# Patient Record
Sex: FEMALE | Race: WHITE | Employment: FULL TIME | URBAN - METROPOLITAN AREA
[De-identification: names, ages, dates, MRNs, and addresses within clinical notes are randomized per-mention and may not be internally consistent; named-entity substitution may affect disease eponyms.]

---

## 2017-04-19 RX ORDER — SODIUM CHLORIDE 9 MG/ML
25 INJECTION, SOLUTION INTRAVENOUS CONTINUOUS
Status: DISPENSED | OUTPATIENT
Start: 2017-04-25 | End: 2017-04-25

## 2017-04-19 RX ORDER — COSYNTROPIN 0.25 MG/ML
0.25 INJECTION, POWDER, FOR SOLUTION INTRAMUSCULAR; INTRAVENOUS ONCE
Status: COMPLETED | OUTPATIENT
Start: 2017-04-25 | End: 2017-04-25

## 2017-04-25 ENCOUNTER — HOSPITAL ENCOUNTER (OUTPATIENT)
Dept: INFUSION THERAPY | Age: 42
Discharge: HOME | End: 2017-04-25
Payer: COMMERCIAL

## 2017-04-25 VITALS
OXYGEN SATURATION: 100 % | RESPIRATION RATE: 16 BRPM | HEART RATE: 76 BPM | SYSTOLIC BLOOD PRESSURE: 110 MMHG | DIASTOLIC BLOOD PRESSURE: 77 MMHG | TEMPERATURE: 97 F

## 2017-04-25 LAB
CORTIS 1H P CHAL SERPL-MCNC: 28.6 UG/DL
CORTIS 30M P CHAL SERPL-MCNC: 26 UG/DL
CORTIS BS SERPL-MCNC: 8.1 UG/DL

## 2017-04-25 PROCEDURE — 82024 ASSAY OF ACTH: CPT | Performed by: INTERNAL MEDICINE

## 2017-04-25 PROCEDURE — 96374 THER/PROPH/DIAG INJ IV PUSH: CPT

## 2017-04-25 PROCEDURE — 36415 COLL VENOUS BLD VENIPUNCTURE: CPT | Performed by: INTERNAL MEDICINE

## 2017-04-25 PROCEDURE — 82533 TOTAL CORTISOL: CPT | Performed by: INTERNAL MEDICINE

## 2017-04-25 PROCEDURE — 74011250636 HC RX REV CODE- 250/636: Performed by: INTERNAL MEDICINE

## 2017-04-25 PROCEDURE — 74011000250 HC RX REV CODE- 250

## 2017-04-25 RX ORDER — SODIUM CHLORIDE 0.9 % (FLUSH) 0.9 %
10 SYRINGE (ML) INJECTION AS NEEDED
Status: ACTIVE | OUTPATIENT
Start: 2017-04-25 | End: 2017-04-25

## 2017-04-25 RX ORDER — SODIUM CHLORIDE 9 MG/ML
INJECTION INTRAMUSCULAR; INTRAVENOUS; SUBCUTANEOUS
Status: COMPLETED
Start: 2017-04-25 | End: 2017-04-25

## 2017-04-25 RX ADMIN — SODIUM CHLORIDE 25 ML/HR: 900 INJECTION, SOLUTION INTRAVENOUS at 08:20

## 2017-04-25 RX ADMIN — COSYNTROPIN 0.25 MG: 0.25 INJECTION, POWDER, LYOPHILIZED, FOR SOLUTION INTRAMUSCULAR; INTRAVENOUS at 08:15

## 2017-04-25 RX ADMIN — Medication 10 ML: at 08:10

## 2017-04-25 RX ADMIN — SODIUM CHLORIDE 10 ML: 9 INJECTION INTRAMUSCULAR; INTRAVENOUS; SUBCUTANEOUS at 08:15

## 2017-04-25 NOTE — PROGRESS NOTES
[facility] VISIT NOTE    740 hrs   Pt arrived at [address] [place] [address] ambulatory and in no distress for Cortisol (Fasting). Assessment completed, pt voiced no new concerns at this time. Right wrist PIV #24 established with no difficulty. Positive blood return noted and baseline labs drawn. Blood pressure 110/77, pulse 76, temperature 97 °F (36.1 °C), resp. rate 16, SpO2 100 %, not currently breastfeeding. 0845 hrs - 30 minute labs drawn. 0915 hrs - 60 minute labs drawn. Medications received:  Cortisol IVP    Tolerated treatment well, no adverse reaction noted. PIV flushed per protocol. Positive blood return noted. No redness or swelling noted    0940 hrs -  D/C'd from [address] [place] [address] ambulatory and in no distress. Next appointment is TBD.

## 2017-04-26 LAB — ACTH PLAS-MCNC: 18.8 PG/ML (ref 7.2–63.3)

## 2017-06-16 ENCOUNTER — HOSPITAL ENCOUNTER (OUTPATIENT)
Dept: MRI IMAGING | Age: 42
Discharge: HOME | End: 2017-06-16
Attending: INTERNAL MEDICINE
Payer: COMMERCIAL

## 2017-06-16 DIAGNOSIS — R51.9 HA (HEADACHE): ICD-10-CM

## 2017-06-16 PROCEDURE — 70553 MRI BRAIN STEM W/O & W/DYE: CPT

## 2017-06-16 PROCEDURE — 74011000258 HC RX REV CODE- 258: Performed by: INTERNAL MEDICINE

## 2017-06-16 PROCEDURE — A9585 GADOBUTROL INJECTION: HCPCS | Performed by: INTERNAL MEDICINE

## 2017-06-16 PROCEDURE — 74011250636 HC RX REV CODE- 250/636: Performed by: INTERNAL MEDICINE

## 2017-06-16 RX ADMIN — SODIUM CHLORIDE 100 ML: 900 INJECTION, SOLUTION INTRAVENOUS at 12:00

## 2017-06-16 RX ADMIN — GADOBUTROL 7.5 ML: 604.72 INJECTION INTRAVENOUS at 12:00

## 2017-08-11 ENCOUNTER — OFFICE VISIT (OUTPATIENT)
Dept: NEUROLOGY | Age: 42
End: 2017-08-11

## 2017-08-11 VITALS
OXYGEN SATURATION: 100 % | TEMPERATURE: 98.3 F | HEART RATE: 66 BPM | HEIGHT: 68 IN | RESPIRATION RATE: 18 BRPM | BODY MASS INDEX: 29.25 KG/M2 | WEIGHT: 193 LBS | DIASTOLIC BLOOD PRESSURE: 68 MMHG | SYSTOLIC BLOOD PRESSURE: 110 MMHG

## 2017-08-11 DIAGNOSIS — G43.119 MIGRAINE WITH AURA, INTRACTABLE, WITHOUT STATUS MIGRAINOSUS: Primary | ICD-10-CM

## 2017-08-11 DIAGNOSIS — R42 DIZZY: ICD-10-CM

## 2017-08-11 DIAGNOSIS — R42 DIZZY: Primary | ICD-10-CM

## 2017-08-11 RX ORDER — SUMATRIPTAN AND NAPROXEN SODIUM 85; 500 MG/1; MG/1
TABLET, FILM COATED ORAL
Qty: 3 TAB | Refills: 0 | Status: SHIPPED | COMMUNITY
Start: 2017-08-11 | End: 2017-12-05

## 2017-08-11 RX ORDER — THYROID, PORCINE 30 MG/1
105 TABLET ORAL DAILY
Refills: 0 | COMMUNITY
Start: 2017-07-11

## 2017-08-11 RX ORDER — SUMATRIPTAN AND NAPROXEN SODIUM 85; 500 MG/1; MG/1
TABLET, FILM COATED ORAL
Qty: 9 TAB | Refills: 5 | Status: SHIPPED | OUTPATIENT
Start: 2017-08-11 | End: 2017-10-12 | Stop reason: SDUPTHER

## 2017-08-11 RX ORDER — BISMUTH SUBSALICYLATE 262 MG
1 TABLET,CHEWABLE ORAL DAILY
COMMUNITY

## 2017-08-11 NOTE — MR AVS SNAPSHOT
Visit Information Date & Time Provider Department Dept. Phone Encounter #  
 8/11/2017  1:30 PM Ras Mills MD [facility] Neurology [facility] [phone] [id number] Follow-up Instructions Return in about 2 months (around 10/11/2017). Upcoming Health Maintenance Date Due DTaP/Tdap/Td series (1 - Tdap) 8/16/1998 PAP AKA CERVICAL CYTOLOGY 8/16/1998 INFLUENZA AGE 9 TO ADULT 8/1/2017 Allergies as of 8/11/2017  Review Complete On: 8/11/2017 By: Ras Mills MD  
 No Known Allergies Current Immunizations  Never Reviewed Name Date Influenza Vaccine 10/25/2016 Not reviewed this visit You Were Diagnosed With   
  
 Codes Comments Migraine with aura, intractable, without status migrainosus    -  Primary ICD-10-CM: G43.119 ICD-9-CM: 346.01 Dizzy     ICD-10-CM: R44 ICD-9-CM: 780.4 Vitals BP Pulse Temp Resp Height(growth percentile) Weight(growth percentile) 110/68 66 98.3 °F (36.8 °C) 18 5' 8\" (1.727 m) 193 lb (87.5 kg) SpO2 BMI Smoking Status 100% 29.35 kg/m2 Never Smoker BMI and BSA Data Body Mass Index Body Surface Area  
 29.35 kg/m 2 2.05 m 2 Preferred Pharmacy Pharmacy Name Phone [address], [address] [place] [address] [phone] Your Updated Medication List  
  
   
This list is accurate as of: 8/11/17  2:21 PM.  Always use your most recent med list.  
  
  
  
  
 ARMOUR THYROID 30 mg tablet Generic drug:  thyroid (Pork) 105 mcg daily. IRON PO Take  by mouth.  
  
 multivitamin tablet Commonly known as:  ONE A DAY Take 1 Tab by mouth daily. SUMAtriptan-naproxen  mg tablet Commonly known as:  TREXIMET  
1 at HA onset and repeat in 2 hours x 1 prn  Max 2 in 24 hours SUPER B COMPLEX PO Take  by mouth. VITAMIN D3 PO Take  by mouth. Prescriptions Sent to Pharmacy Refills SUMAtriptan-naproxen (TREXIMET)  mg tablet 5 Si at HA onset and repeat in 2 hours x 1 prn  Max 2 in 24 hours Class: Normal  
 Pharmacy: [id number]  #: [phone] Follow-up Instructions Return in about 2 months (around 10/11/2017). To-Do List   
 2017 Imaging:  DUPLEX CAROTID BILATERAL AMB NEURO   
  
 2017 Neurology:  EEG Patient Instructions Information Regarding Testing If you have physican order for a test or a medication denied by your insurance company, this does not mean the test or medication is not appropriate for you as that is a medical decision, not a decision to be made by an insurance company representative or by an [facility] physician who has not interviewed and examined you. This is a decision to be made between you and your physician. The denial of services is a contractual matter between you and your insurance company, not an issue between your physician and the insurance company. If your test or medication is denied, you can take the following steps to help resolve the issue: 1. File a complaint with the [facility] of [employer] regarding your insurance company's denial of services ordered for you. You can do this either by calling them directly or by completing an on-line complaint form on the [employer]. This can be found at www.[place].Picreel 2. Also file a formal complaint with your insurance company and ask to have the name of the person denying the service so that you may explore a legal option should you be harmed by this denial of service. Again, the fact the insurance company will not pay for the service does not mean it is not medically necessary and I would encourage you to follow through with the plan that was made with your physician 3.   File a written complaint with your employer so your employer and benefit manager is aware of the poor coverage they are providing their employees. If you have medicare/medicaid, complain to your representative in the House and to your Niki Reid. PRESCRIPTION REFILL POLICY [facility] Statement to Patients April 1, 2014 In an effort to ensure the large volume of patient prescription refills is processed in the most efficient and expeditious manner, we are asking our patients to assist us by calling your Pharmacy for all prescription refills, this will include also your  Mail Order Pharmacy. The pharmacy will contact our office electronically to continue the refill process. Please do not wait until the last minute to call your pharmacy. We need at least 48 hours (2days) to fill prescriptions. We also encourage you to call your pharmacy before going to  your prescription to make sure it is ready. With regard to controlled substance prescription refill requests (narcotic refills) that need to be picked up at our office, we ask your cooperation by providing us with at least 72 hours (3days) notice that you will need a refill. We will not refill narcotic prescription refill requests after 4:00pm on any weekday, Monday through Thursday, or after 2:00pm on Fridays, or on the weekends. We encourage everyone to explore another way of getting your prescription refill request processed using [email or web address], our patient web portal through our electronic medical record system. [email or web address] is an efficient and effective way to communicate your medication request directly to the office and  downloadable as an patti on your smart phone . [email or web address] also features a review functionality that allows you to view your medication list as well as leave messages for your physician. Are you ready to get connected?  If so please review the attatched instructions or speak to any of our staff to get you set up right away! Thank you so much for your cooperation. Should you have any questions please contact our Practice Administrator. The Physicians and Staff,  [facility] If we have ordered testing for you, we do not call patients with results and we do not give test results over the phone. We schedule follow up appointments so that your results can be discussed in person and any questions you have regarding them may be addressed. If something of concern is revealed on your test, we will call you for a sooner follow up appointment. Additionally, results may be found by using the My Chart feature and one of our patient service representatives at the  can give you instructions on how to access this feature of our electronic medical record system. Estella Israel 17[address] What is a living will? A living will is a legal form you use to write down the kind of care you want at the end of your life. It is used by the health professionals who will treat you if you aren't able to decide for yourself. If you put your wishes in writing, your loved ones and others will know what kind of care you want. They won't need to guess. This can ease your mind and be helpful to others. A living will is not the same as an estate or property will. An estate will explains what you want to happen with your money and property after you die. Is a living will a legal document? A living will is a legal document. Each state has its own laws about living turner. If you move to another state, make sure that your living will is legal in the state where you now live. Or you might use a universal form that has been approved by many states. This kind of form can sometimes be completed and stored online. Your electronic copy will then be available wherever you have a connection to the Internet.  In most cases, doctors will respect your wishes even if you have a form from a different state. · You don't need an  to complete a living will. But legal advice can be helpful if your state's laws are unclear, your health history is complicated, or your family can't agree on what should be in your living will. · You can change your living will at any time. Some people find that their wishes about end-of-life care change as their health changes. · In addition to making a living will, think about completing a medical power of  form. This form lets you name the person you want to make end-of-life treatment decisions for you (your \"health care agent\") if you're not able to. Many hospitals and nursing homes will give you the forms you need to complete a living will and a medical power of . · Your living will is used only if you can't make or communicate decisions for yourself anymore. If you become able to make decisions again, you can accept or refuse any treatment, no matter what you wrote in your living will. · Your state may offer an online registry. This is a place where you can store your living will online so the doctors and nurses who need to treat you can find it right away. What should you think about when creating a living will? Talk about your end-of-life wishes with your family members and your doctor. Let them know what you want. That way the people making decisions for you won't be surprised by your choices. Think about these questions as you make your living will: · Do you know enough about life support methods that might be used? If not, talk to your doctor so you know what might be done if you can't breathe on your own, your heart stops, or you're unable to swallow. · What things would you still want to be able to do after you receive life-support methods? Would you want to be able to walk? To speak? To eat on your own? To live without the help of machines? · If you have a choice, where do you want to be cared for? In your home? At a hospital or nursing home? · Do you want certain Sikhism practices performed if you become very ill? · If you have a choice at the end of your life, where would you prefer to die? At home? In a hospital or nursing home? Somewhere else? · Would you prefer to be buried or cremated? · Do you want your organs to be donated after you die? What should you do with your living will? · Make sure that your family members and your health care agent have copies of your living will. · Give your doctor a copy of your living will to keep in your medical record. If you have more than one doctor, make sure that each one has a copy. · You may want to put a copy of your living will where it can be easily found. Where can you learn more? Go to http://marie-nuno.info/. Enter T720 in the search box to learn more about \"Learning About Living Perromario. \" Current as of: August 8, 2016 Content Version: 11.3 © 7782-5836 [employer]. Care instructions adapted under license by [employer] (which disclaims liability or warranty for this information). If you have questions about a medical condition or this instruction, always ask your healthcare professional. [address] any warranty or liability for your use of this information. Use Treximet for acute HA.  1 at HA onset and repeat in 2 hours x1 if needed. Maximum 2 in 24 hours Track headaches on a calendar and bring to each appointment. Introducing [facility] [place] [facility]! Skye Cassidy introduces [employer] patient portal. Now you can access parts of your medical record, email your doctor's office, and request medication refills online. 1. In your internet browser, go to [email or web address]://[email or web address]. [employer]/Personal [employer] Systems 2. Click on the First Time User? Click Here link in the Sign In box. You will see the New Member Sign Up page. 3. Enter your [employer] Access Code exactly as it appears below.  You will not need to use this code after youve completed the sign-up process. If you do not sign up before the expiration date, you must request a new code. · import2 Access Code: [email or web address] Expires: 11/9/2017 12:59 PM 
 
4. Enter the last four digits of your Social Security Number (xxxx) and Date of Birth (mm/dd/yyyy) as indicated and click Submit. You will be taken to the next sign-up page. 5. Create a import2 ID. This will be your import2 login ID and cannot be changed, so think of one that is secure and easy to remember. 6. Create a import2 password. You can change your password at any time. 7. Enter your Password Reset Question and Answer. This can be used at a later time if you forget your password. 8. Enter your e-mail address. You will receive e-mail notification when new information is available in [address] 9. Click Sign Up. You can now view and download portions of your medical record. 10. Click the Download Summary menu link to download a portable copy of your medical information. If you have questions, please visit the Frequently Asked Questions section of the import2 website. Remember, import2 is NOT to be used for urgent needs. For medical emergencies, dial 911. Now available from your iPhone and Android! Please provide this summary of care documentation to your next provider. Your primary care clinician is listed as Moon Hemphill MD. If you have any questions after today's visit, please call [phone].

## 2017-08-11 NOTE — PROGRESS NOTES
[facility]. [address]   [place] [address] [facility] [place] [address] [phone] [id number]   [phone] Fax             Referring: Naima Lopez MD      Chief Complaint   Patient presents with    Headache     new patient     42-year-old right-handed woman who presents today for evaluation of headache. At about 11 years ago she saw Dr. Veronica Long for evaluation of headache and was diagnosed with migraine. She underwent MRI scanning and that was said to be normal.  She was given Fioricet. She then went for multiple years without any significant headaches. In January she started to have an increase in her headache intensity. She notes that she started to have an increased number of headaches in the same week and that she would continue to have a dull underlying headache within 1-2 headaches a week that would intensify and become more intense. She tells me that the headaches are located over her entire head and describes the discomfort as a throbbing sensation with some associated neck pain. She has associated photophobia and phonophobia nausea and at times will vomit. At times she feels lightheaded with these. She does not lose consciousness but at times she feels like she may. She does not awaken in the floor and not know how she got there. She does not have spinning. She does not lose vision. .  She says that her blood pressure typically runs low anyway and she believes that may have some bearing. She notes that she will have 4-5 headache days per week and at least one day per week is an intense headache day. She typically will use an ice pack to try to help. It hurts to move. An average duration is at least 4 hours. No focal deficits. She is tried over-the-counter medications and that has not done anything. She has not been given any other medications to try. She does not have any symptoms suggestive of obstructive sleep apnea.   She has not had any fever or chills. No injury. No inciting factor for why her headaches will have changed. No changes in lifestyle. No coronary disease. No chest pain. No palpitations. No visual loss. No aura. Past Medical History:   Diagnosis Date    Anxiety     Fatigue     Headache     Nausea     Ringing in ears     Thyroid disease     Vertigo     Visual disturbance      Past Surgical History:   Procedure Laterality Date    HX HEENT      right thyroid removal     Current Outpatient Prescriptions   Medication Sig Dispense Refill    ARMOUR THYROID 30 mg tablet 105 mcg daily. 0    multivitamin (ONE A DAY) tablet Take 1 Tab by mouth daily.  CHOLECALCIFEROL, VITAMIN D3, (VITAMIN D3 PO) Take  by mouth.  FERROUS FUMARATE/VIT BCOMP,C (SUPER B COMPLEX PO) Take  by mouth.  FERROUS SULFATE (IRON PO) Take  by mouth. No Known Allergies    Social History   Substance Use Topics    Smoking status: Never Smoker    Smokeless tobacco: Never Used    Alcohol use No       Family History   Problem Relation Age of Onset    Headache Father     Heart Disease Father     Neuropathy Father     Stroke Father     Diabetes Father     Dementia Maternal Grandmother     Cancer Paternal Grandfather      Review of Systems  Pertinent positives and negatives are as noted above otherwise comprehensive systems review is negative. Examination  Visit Vitals    /68    Pulse 66    Temp 98.3 °F (36.8 °C)    Resp 18    Ht 5' 8\" (1.727 m)    Wt 87.5 kg (193 lb)    SpO2 100%    BMI 29.35 kg/m2     Pleasant, well appearing. No icterus. Oropharynx clear. Supple neck without bruit. Heart regular. No murmur. No edema. Neurologically, she is awake, alert, and oriented with normal speech and language. Her cognition is normal.    Intact cranial nerves 2-12. No nystagmus. Visual fields full to confrontation. Disk margins are flat bilaterally. She has normal bulk and tone.   She has no abnormal movement. She has no pronation or drift. She generates full strength in the upper and lower extremities to direct confrontational testing. Reflexes are symmetrical in the upper and lower extremities bilaterally. Her toes are down bilaterally. No Crump. Finger nose finger and rapid alternating movements are normal.  Steady gait. No sensory deficit to primary modalities. Impression/Plan  82-year-old lady with migraine and we discussed migraine pathophysiology, the manner in which we treat, symptoms, avoiding over-the-counter medications, analgesic overuse, etc.  We will treat these more intense headaches with Treximet 1 at headache onset repeat in 2 hours maximum 2 in 24 hours. Administration, potential side effects, and alternatives were discussed. Written headache education given today. She will track her headaches on a calendar and bring that to each appointment so will have objective numbers from which to work and from which to make decisions regarding possibility of preventative medications etc.    For her complaints of dizziness we will exclude any other ominous etiology with EEG and carotid Doppler. No need to repeat any advanced imaging given the fact she has had MRI in the past and her examination is normal today. Follow-up in about 2 months with her headache diary. This note was created using voice recognition software. Despite editing, there may be syntax errors. This note will not be viewable in [address]

## 2017-08-11 NOTE — PROGRESS NOTES
New patient presenting with headaches. History of migraines since 2006. Reports 10-15 headaches per month lasting 4 hours or more. Reports daily headaches that progressively gets worse during the day. See jagged lines with intense headaches. Had MRI of brain done.

## 2017-08-11 NOTE — PATIENT INSTRUCTIONS
Information Regarding Testing     If you have physican order for a test or a medication denied by your insurance company, this does not mean the test or medication is not appropriate for you as that is a medical decision, not a decision to be made by an insurance company representative or by an [facility] physician who has not interviewed and examined you. This is a decision to be made between you and your physician. The denial of services is a contractual matter between you and your insurance company, not an issue between your physician and the insurance company. If your test or medication is denied, you can take the following steps to help resolve the issue:    1. File a complaint with the [facility] regarding your insurance company's denial of services ordered for you. You can do this either by calling them directly or by completing an on-line complaint form on the [employer]. This can be found at www.Confer Technologies    2. Also file a formal complaint with your insurance company and ask to have the name of the person denying the service so that you may explore a legal option should you be harmed by this denial of service. Again, the fact the insurance company will not pay for the service does not mean it is not medically necessary and I would encourage you to follow through with the plan that was made with your physician    3. File a written complaint with your employer so your employer and benefit manager is aware of the poor coverage they are providing their employees. If you have medicare/medicaid, complain to your representative in the House and to your Niki Reid.     [facility]   Statement to Patients  April 1, 2014      In an effort to ensure the large volume of patient prescription refills is processed in the most efficient and expeditious manner, we are asking our patients to assist us by calling your Pharmacy for all prescription refills, this will include also your  Mail Order Pharmacy. The pharmacy will contact our office electronically to continue the refill process. Please do not wait until the last minute to call your pharmacy. We need at least 48 hours (2days) to fill prescriptions. We also encourage you to call your pharmacy before going to  your prescription to make sure it is ready. With regard to controlled substance prescription refill requests (narcotic refills) that need to be picked up at our office, we ask your cooperation by providing us with at least 72 hours (3days) notice that you will need a refill. We will not refill narcotic prescription refill requests after 4:00pm on any weekday, Monday through Thursday, or after 2:00pm on Fridays, or on the weekends. We encourage everyone to explore another way of getting your prescription refill request processed using Vyopta, our patient web portal through our electronic medical record system. Vyopta is an efficient and effective way to communicate your medication request directly to the office and  downloadable as an patti on your smart phone . Vyopta also features a review functionality that allows you to view your medication list as well as leave messages for your physician. Are you ready to get connected? If so please review the attatched instructions or speak to any of our staff to get you set up right away! Thank you so much for your cooperation. Should you have any questions please contact our Practice Administrator. The Physicians and Staff,  [facility]     If we have ordered testing for you, we do not call patients with results and we do not give test results over the phone. We schedule follow up appointments so that your results can be discussed in person and any questions you have regarding them may be addressed.   If something of concern is revealed on your test, we will call you for a sooner follow up appointment. Additionally, results may be found by using the My Chart feature and one of our patient service representatives at the  can give you instructions on how to access this feature of our electronic medical record system. Learning About [place]  What is a living will? A living will is a legal form you use to write down the kind of care you want at the end of your life. It is used by the health professionals who will treat you if you aren't able to decide for yourself. If you put your wishes in writing, your loved ones and others will know what kind of care you want. They won't need to guess. This can ease your mind and be helpful to others. A living will is not the same as an estate or property will. An estate will explains what you want to happen with your money and property after you die. Is a living will a legal document? A living will is a legal document. Each state has its own laws about living turnre. If you move to another state, make sure that your living will is legal in the state where you now live. Or you might use a universal form that has been approved by many states. This kind of form can sometimes be completed and stored online. Your electronic copy will then be available wherever you have a connection to the Internet. In most cases, doctors will respect your wishes even if you have a form from a different state. · You don't need an  to complete a living will. But legal advice can be helpful if your state's laws are unclear, your health history is complicated, or your family can't agree on what should be in your living will. · You can change your living will at any time. Some people find that their wishes about end-of-life care change as their health changes. · In addition to making a living will, think about completing a medical power of  form.  This form lets you name the person you want to make end-of-life treatment decisions for you (your \"health care agent\") if you're not able to. Many hospitals and nursing homes will give you the forms you need to complete a living will and a medical power of . · Your living will is used only if you can't make or communicate decisions for yourself anymore. If you become able to make decisions again, you can accept or refuse any treatment, no matter what you wrote in your living will. · Your state may offer an online registry. This is a place where you can store your living will online so the doctors and nurses who need to treat you can find it right away. What should you think about when creating a living will? Talk about your end-of-life wishes with your family members and your doctor. Let them know what you want. That way the people making decisions for you won't be surprised by your choices. Think about these questions as you make your living will:  · Do you know enough about life support methods that might be used? If not, talk to your doctor so you know what might be done if you can't breathe on your own, your heart stops, or you're unable to swallow. · What things would you still want to be able to do after you receive life-support methods? Would you want to be able to walk? To speak? To eat on your own? To live without the help of machines? · If you have a choice, where do you want to be cared for? In your home? At a hospital or nursing home? · Do you want certain Yazdanism practices performed if you become very ill? · If you have a choice at the end of your life, where would you prefer to die? At home? In a hospital or nursing home? Somewhere else? · Would you prefer to be buried or cremated? · Do you want your organs to be donated after you die? What should you do with your living will? · Make sure that your family members and your health care agent have copies of your living will. · Give your doctor a copy of your living will to keep in your medical record.  If you have more than one doctor, make sure that each one has a copy. · You may want to put a copy of your living will where it can be easily found. Where can you learn more? Go to http://marie-nuno.info/. Enter S169 in the search box to learn more about \"Learning About Living [place]. \"  Current as of: August 8, 2016  Content Version: 11.3  © 8718-9353 [email or web address]. Care instructions adapted under license by [employer] (which disclaims liability or warranty for this information). If you have questions about a medical condition or this instruction, always ask your healthcare professional. [place] [address] any warranty or liability for your use of this information. Use Treximet for acute HA.  1 at HA onset and repeat in 2 hours x1 if needed. Maximum 2 in 24 hours    Track headaches on a calendar and bring to each appointment.

## 2017-08-20 NOTE — PROCEDURES
Carotid Doppler:     Date:  08/11/17    Requesting Physician:  Christiana Bello. MD Rocky     Indication:  Dizziness. .     B-mode imaging reveals no significant plaque throughout the carotid systems bilaterally. Doppler spectral analysis reveals no elevated velocities. Vertebral artery flow antegrade bilaterally. Interpretation:  Normal study.

## 2017-08-22 ENCOUNTER — HOSPITAL ENCOUNTER (OUTPATIENT)
Dept: NEUROLOGY | Age: 42
Discharge: HOME | End: 2017-08-22
Attending: PSYCHIATRY & NEUROLOGY
Payer: COMMERCIAL

## 2017-08-22 DIAGNOSIS — R42 DIZZY: ICD-10-CM

## 2017-08-22 PROCEDURE — 95816 EEG AWAKE AND DROWSY: CPT

## 2017-08-24 NOTE — PROCEDURES
Donald [facility]   [address] [facility], [address]   ROUTINE EEG REPORT       Name:  Nidhi Ybarra   MR#:  [id number]   :  1975   Account #:  [de-identified]    Date of Procedure:  2017   Date of Adm:  2017       REQUESTING PHYSICIAN: Supriya Hidalgo MD    INDICATIONS: An EEG is requested in this 38year-old with dizziness,   headache and visual disturbance to evaluate for epileptiform   abnormalities. MEDICATIONS LISTED AS:   1. Treximet. 2. Vitamin D.   3. Iron. 4. Thyroid. DESCRIPTION: Tracing is obtained during the awake, drowsy, and   sleeping states. During wakefulness, there are intermittent runs of   posteriorly dominant symmetrical low to medium amplitude, 10-11   cycle per second activities, which attenuate with eye opening. Lower   voltage, faster frequency activities are seen symmetrically over the   anterior head regions. Hyperventilation is performed for 3 minutes and little alters the tracing. Intermittent photic stimulation little alters the tracing. During drowsiness, the background rhythms attenuate and are   replaced with diffuse symmetric theta range activities. There is the later   emergence of symmetric vertex sharp waves. Later stages of sleep are   not attained. INTERPRETATION: This EEG recorded during the awake state is   normal. No epileptiform abnormalities are seen.          Rayo Borden MD      SLE / CD   D:  2017   13:40   T:  2017   06:20   Job #:  [id number]

## 2017-10-12 ENCOUNTER — OFFICE VISIT (OUTPATIENT)
Dept: NEUROLOGY | Age: 42
End: 2017-10-12

## 2017-10-12 VITALS
BODY MASS INDEX: 30.01 KG/M2 | WEIGHT: 198 LBS | SYSTOLIC BLOOD PRESSURE: 120 MMHG | HEIGHT: 68 IN | DIASTOLIC BLOOD PRESSURE: 76 MMHG

## 2017-10-12 DIAGNOSIS — G43.119 MIGRAINE WITH AURA, INTRACTABLE, WITHOUT STATUS MIGRAINOSUS: Primary | ICD-10-CM

## 2017-10-12 RX ORDER — SUMATRIPTAN AND NAPROXEN SODIUM 85; 500 MG/1; MG/1
TABLET, FILM COATED ORAL
Qty: 18 TAB | Refills: 5 | Status: SHIPPED | OUTPATIENT
Start: 2017-10-12 | End: 2018-01-16 | Stop reason: SDUPTHER

## 2017-10-12 NOTE — MR AVS SNAPSHOT
Visit Information Date & Time Provider Department Dept. Phone Encounter #  
 10/12/2017  3:30 PM Glenn Hancock, NP [address] [facility] [phone] [id number] Upcoming Health Maintenance Date Due DTaP/Tdap/Td series (1 - Tdap) 8/16/1998 PAP AKA CERVICAL CYTOLOGY 8/16/1998 INFLUENZA AGE 9 TO ADULT 8/1/2017 Allergies as of 10/12/2017  Review Complete On: 10/12/2017 By: Ольга Villeda No Known Allergies Current Immunizations  Never Reviewed Name Date Influenza Vaccine 10/25/2016 Not reviewed this visit You Were Diagnosed With   
  
 Codes Comments Migraine with aura, intractable, without status migrainosus    -  Primary ICD-10-CM: G43.119 ICD-9-CM: 346.01 Vitals BP Height(growth percentile) Weight(growth percentile) BMI Smoking Status 120/76 5' 8\" (1.727 m) 198 lb (89.8 kg) 30.11 kg/m2 Never Smoker BMI and BSA Data Body Mass Index Body Surface Area  
 30.11 kg/m 2 2.08 m 2 Preferred Pharmacy Pharmacy Name Phone [address], [address] [place] [address] [phone] Your Updated Medication List  
  
   
This list is accurate as of: 10/12/17  3:44 PM.  Always use your most recent med list.  
  
  
  
  
 ARMOUR THYROID 30 mg tablet Generic drug:  thyroid (Pork) 105 mcg daily. IRON PO Take  by mouth.  
  
 multivitamin tablet Commonly known as:  ONE A DAY Take 1 Tab by mouth daily. * SUMAtriptan-naproxen  mg tablet Commonly known as:  TREXIMET As directed * SUMAtriptan-naproxen  mg tablet Commonly known as:  TREXIMET  
1 at HA onset and repeat in 2 hours x 1 prn  Max 2 in 24 hours SUPER B COMPLEX PO Take  by mouth. VITAMIN D3 PO Take  by mouth. * Notice:   This list has 2 medication(s) that are the same as other medications prescribed for you. Read the directions carefully, and ask your doctor or other care provider to review them with you. Prescriptions Sent to Pharmacy Refills SUMAtriptan-naproxen (TREXIMET)  mg tablet 5 Si at HA onset and repeat in 2 hours x 1 prn  Max 2 in 24 hours Class: Normal  
 Pharmacy: [facility] [address], [facility] #: [phone] Patient Instructions PRESCRIPTION REFILL POLICY [facility] Statement to Patients 2014 In an effort to ensure the large volume of patient prescription refills is processed in the most efficient and expeditious manner, we are asking our patients to assist us by calling your Pharmacy for all prescription refills, this will include also your  Mail Order Pharmacy. The pharmacy will contact our office electronically to continue the refill process. Please do not wait until the last minute to call your pharmacy. We need at least 48 hours (2days) to fill prescriptions. We also encourage you to call your pharmacy before going to  your prescription to make sure it is ready. With regard to controlled substance prescription refill requests (narcotic refills) that need to be picked up at our office, we ask your cooperation by providing us with at least 72 hours (3days) notice that you will need a refill. We will not refill narcotic prescription refill requests after 4:00pm on any weekday, Monday through Thursday, or after 2:00pm on , or on the weekends. We encourage everyone to explore another way of getting your prescription refill request processed using [employer], our patient web portal through our electronic medical record system. [employer] is an efficient and effective way to communicate your medication request directly to the office and  downloadable as an patti on your smart phone .  [employer] also features a review functionality that allows you to view your medication list as well as leave messages for your physician. Are you ready to get connected? If so please review the attatched instructions or speak to any of our staff to get you set up right away! Thank you so much for your cooperation. Should you have any questions please contact our Practice Administrator. The Physicians and Staff,  [facility] [address]ella [address] What is a living will? A living will is a legal form you use to write down the kind of care you want at the end of your life. It is used by the health professionals who will treat you if you aren't able to decide for yourself. If you put your wishes in writing, your loved ones and others will know what kind of care you want. They won't need to guess. This can ease your mind and be helpful to others. A living will is not the same as an estate or property will. An estate will explains what you want to happen with your money and property after you die. Is a living will a legal document? A living will is a legal document. Each state has its own laws about living turner. If you move to another state, make sure that your living will is legal in the state where you now live. Or you might use a universal form that has been approved by many states. This kind of form can sometimes be completed and stored online. Your electronic copy will then be available wherever you have a connection to the Internet. In most cases, doctors will respect your wishes even if you have a form from a different state. · You don't need an  to complete a living will. But legal advice can be helpful if your state's laws are unclear, your health history is complicated, or your family can't agree on what should be in your living will. · You can change your living will at any time. Some people find that their wishes about end-of-life care change as their health changes. · In addition to making a living will, think about completing a medical power of  form. This form lets you name the person you want to make end-of-life treatment decisions for you (your \"health care agent\") if you're not able to. Many hospitals and nursing homes will give you the forms you need to complete a living will and a medical power of . · Your living will is used only if you can't make or communicate decisions for yourself anymore. If you become able to make decisions again, you can accept or refuse any treatment, no matter what you wrote in your living will. · Your state may offer an online registry. This is a place where you can store your living will online so the doctors and nurses who need to treat you can find it right away. What should you think about when creating a living will? Talk about your end-of-life wishes with your family members and your doctor. Let them know what you want. That way the people making decisions for you won't be surprised by your choices. Think about these questions as you make your living will: · Do you know enough about life support methods that might be used? If not, talk to your doctor so you know what might be done if you can't breathe on your own, your heart stops, or you're unable to swallow. · What things would you still want to be able to do after you receive life-support methods? Would you want to be able to walk? To speak? To eat on your own? To live without the help of machines? · If you have a choice, where do you want to be cared for? In your home? At a hospital or nursing home? · Do you want certain Caodaism practices performed if you become very ill? · If you have a choice at the end of your life, where would you prefer to die? At home? In a hospital or nursing home? Somewhere else? · Would you prefer to be buried or cremated? · Do you want your organs to be donated after you die? What should you do with your living will? · Make sure that your family members and your health care agent have copies of your living will. · Give your doctor a copy of your living will to keep in your medical record. If you have more than one doctor, make sure that each one has a copy. · You may want to put a copy of your living will where it can be easily found. Where can you learn more? Go to http://marie-nuno.info/. Enter V513 in the search box to learn more about \"Learning About Living Evelia. \" Current as of: August 8, 2016 Content Version: 11.3 © 7327-1756 Nangate. Care instructions adapted under license by [employer] (which disclaims liability or warranty for this information). If you have questions about a medical condition or this instruction, always ask your healthcare professional. [address] any warranty or liability for your use of this information. Advance Directives: Care Instructions Your Care Instructions An advance directive is a legal way to state your wishes at the end of your life. It tells your family and your doctor what to do if you can no longer say what you want. There are two main types of advance directives. You can change them any time that your wishes change. · A living will tells your family and your doctor your wishes about life support and other treatment. · A durable power of  for health care lets you name a person to make treatment decisions for you when you can't speak for yourself. This person is called a health care agent. If you do not have an advance directive, decisions about your medical care may be made by a doctor or a  who doesn't know you. It may help to think of an advance directive as a gift to the people who care for you. If you have one, they won't have to make tough decisions by themselves. Follow-up care is a key part of your treatment and safety.  Be sure to make and go to all appointments, and call your doctor if you are having problems. It's also a good idea to know your test results and keep a list of the medicines you take. How can you care for yourself at home? · Discuss your wishes with your loved ones and your doctor. This way, there are no surprises. · Many states have a unique form. Or you might use a universal form that has been approved by many states. This kind of form can sometimes be completed and stored online. Your electronic copy will then be available wherever you have a connection to the Internet. In most cases, doctors will respect your wishes even if you have a form from a different state. · You don't need a  to do an advance directive. But you may want to get legal advice. · Think about these questions when you prepare an advance directive: ¨ Who do you want to make decisions about your medical care if you are not able to? Many people choose a family member or close friend. ¨ Do you know enough about life support methods that might be used? If not, talk to your doctor so you understand. ¨ What are you most afraid of that might happen? You might be afraid of having pain, losing your independence, or being kept alive by machines. ¨ Where would you prefer to die? Choices include your home, a hospital, or a nursing home. ¨ Would you like to have information about hospice care to support you and your family? ¨ Do you want to donate organs when you die? ¨ Do you want certain Adventism practices performed before you die? If so, put your wishes in the advance directive. · Read your advance directive every year, and make changes as needed. When should you call for help? Be sure to contact your doctor if you have any questions. Where can you learn more? Go to http://marie-nuno.info/. Enter R264 in the search box to learn more about \"Advance Directives: Care Instructions. \" Current as of: November 17, 2016 Content Version: 11.3 © 1833-6290 Innoviti, PlumTV. Care instructions adapted under license by [employer] Technologies Inc. (which disclaims liability or warranty for this information). If you have questions about a medical condition or this instruction, always ask your healthcare professional. [address] any warranty or liability for your use of this information. Introducing [facility] & HEALTH SERVICES! Tiffanie Wilson introduces onkea patient portal. Now you can access parts of your medical record, email your doctor's office, and request medication refills online. 1. In your internet browser, go to [email or web address] 2. Click on the First Time User? Click Here link in the Sign In box. You will see the New Member Sign Up page. 3. Enter your onkea Access Code exactly as it appears below. You will not need to use this code after youve completed the sign-up process. If you do not sign up before the expiration date, you must request a new code. · onkea Access Code: [email or web address] Expires: 11/9/2017 12:59 PM 
 
4. Enter the last four digits of your Social Security Number (xxxx) and Date of Birth (mm/dd/yyyy) as indicated and click Submit. You will be taken to the next sign-up page. 5. Create a onkea ID. This will be your onkea login ID and cannot be changed, so think of one that is secure and easy to remember. 6. Create a onkea password. You can change your password at any time. 7. Enter your Password Reset Question and Answer. This can be used at a later time if you forget your password. 8. Enter your e-mail address. You will receive e-mail notification when new information is available in [address] 9. Click Sign Up. You can now view and download portions of your medical record. 10. Click the Download Summary menu link to download a portable copy of your medical information. If you have questions, please visit the Frequently Asked Questions section of the ITYZt website. Remember, [employer] is NOT to be used for urgent needs. For medical emergencies, dial 911. Now available from your iPhone and Android! Please provide this summary of care documentation to your next provider. Your primary care clinician is listed as Maida Rios MD. If you have any questions after today's visit, please call [phone].

## 2017-10-12 NOTE — PROGRESS NOTES
Nav Sevilla is a 38 y.o. female who presents with the following  Chief Complaint   Patient presents with    Follow-up       HPI Patient comes in for a follow up for EEG and doppler. Keeping track of her headaches on her calendar which we will attach to media. She has been having about 8-10 migraines a month. The treximet is working well usually after about 2 hours in taking it. She does not use the treximet for each headache but when she does it works well. She has not been able to figure out any triggers at this time. No other concerns. No Known Allergies    Current Outpatient Prescriptions   Medication Sig    SUMAtriptan-naproxen (TREXIMET)  mg tablet 1 at HA onset and repeat in 2 hours x 1 prn  Max 2 in 24 hours    ARMOUR THYROID 30 mg tablet 105 mcg daily.  multivitamin (ONE A DAY) tablet Take 1 Tab by mouth daily.  CHOLECALCIFEROL, VITAMIN D3, (VITAMIN D3 PO) Take  by mouth.  FERROUS FUMARATE/VIT BCOMP,C (SUPER B COMPLEX PO) Take  by mouth.  FERROUS SULFATE (IRON PO) Take  by mouth.  SUMAtriptan-naproxen (TREXIMET)  mg tablet As directed     No current facility-administered medications for this visit.         History   Smoking Status    Never Smoker   Smokeless Tobacco    Never Used       Past Medical History:   Diagnosis Date    Anxiety     Fatigue     Headache     Nausea     Ringing in ears     Thyroid disease     Vertigo     Visual disturbance        Past Surgical History:   Procedure Laterality Date    HX HEENT      right thyroid removal       Family History   Problem Relation Age of Onset    Headache Father     Heart Disease Father     Neuropathy Father     Stroke Father     Diabetes Father     Dementia Maternal Grandmother     Cancer Paternal Grandfather        Social History     Social History    Marital status:      Spouse name: N/A    Number of children: N/A    Years of education: N/A     Social History Main Topics    Smoking status: Never Smoker    Smokeless tobacco: Never Used    Alcohol use No    Drug use: None    Sexual activity: Not Asked     Other Topics Concern    None     Social History Narrative       Review of Systems   Eyes: Positive for blurred vision and photophobia. Respiratory: Negative for shortness of breath and wheezing. Gastrointestinal: Negative for nausea and vomiting. Neurological: Positive for dizziness and headaches. Remainder of comprehensive review is negative. Physical Exam :    Visit Vitals    /76    Ht 5' 8\" (1.727 m)    Wt 89.8 kg (198 lb)    BMI 30.11 kg/m2             Results for orders placed or performed during the hospital encounter of 17   COSYNTROPIN ACTH STIMULATION TEST   Result Value Ref Range    Cortisol, baseline 8.1 ug/dL    Cortisol, 30 min. 26.0 ug/dL    Cortisol, 60 min. 28.6 ug/dL   ACTH   Result Value Ref Range    ACTH, plasma 18.8 7.2 - 63.3 pg/mL       Orders Placed This Encounter    SUMAtriptan-naproxen (TREXIMET)  mg tablet     Si at HA onset and repeat in 2 hours x 1 prn  Max 2 in 24 hours     Dispense:  18 Tab     Refill:  5       1. Migraine with aura, intractable, without status migrainosus        Follow-up Disposition: Not on File    EEG and doppler were normal. We discussed her headaches and frequency and preventative medictions. She wants to hold off on daily medications at this time. We discussed magnesium, coq10 and b complex can help and she will look into trying these. Keep track of headaches for treatment. Keep Treximet for abortive therapy. Call if she has any issues.          This note will not be viewable in [employer]

## 2017-10-12 NOTE — PATIENT INSTRUCTIONS
10 [facility]   Statement to Patients  April 1, 2014      In an effort to ensure the large volume of patient prescription refills is processed in the most efficient and expeditious manner, we are asking our patients to assist us by calling your Pharmacy for all prescription refills, this will include also your  Mail Order Pharmacy. The pharmacy will contact our office electronically to continue the refill process. Please do not wait until the last minute to call your pharmacy. We need at least 48 hours (2days) to fill prescriptions. We also encourage you to call your pharmacy before going to  your prescription to make sure it is ready. With regard to controlled substance prescription refill requests (narcotic refills) that need to be picked up at our office, we ask your cooperation by providing us with at least 72 hours (3days) notice that you will need a refill. We will not refill narcotic prescription refill requests after 4:00pm on any weekday, Monday through Thursday, or after 2:00pm on Fridays, or on the weekends. We encourage everyone to explore another way of getting your prescription refill request processed using [employer], our patient web portal through our electronic medical record system. [employer] is an efficient and effective way to communicate your medication request directly to the office and  downloadable as an patti on your smart phone . [employer] also features a review functionality that allows you to view your medication list as well as leave messages for your physician. Are you ready to get connected? If so please review the attatched instructions or speak to any of our staff to get you set up right away! Thank you so much for your cooperation. Should you have any questions please contact our Practice Administrator. The Physicians and Staff,  [facility] [id number] [address]  What is a living will?   A living will is a legal form you use to write down the kind of care you want at the end of your life. It is used by the health professionals who will treat you if you aren't able to decide for yourself. If you put your wishes in writing, your loved ones and others will know what kind of care you want. They won't need to guess. This can ease your mind and be helpful to others. A living will is not the same as an estate or property will. An estate will explains what you want to happen with your money and property after you die. Is a living will a legal document? A living will is a legal document. Each state has its own laws about living turner. If you move to another state, make sure that your living will is legal in the state where you now live. Or you might use a universal form that has been approved by many states. This kind of form can sometimes be completed and stored online. Your electronic copy will then be available wherever you have a connection to the Internet. In most cases, doctors will respect your wishes even if you have a form from a different state. · You don't need an  to complete a living will. But legal advice can be helpful if your state's laws are unclear, your health history is complicated, or your family can't agree on what should be in your living will. · You can change your living will at any time. Some people find that their wishes about end-of-life care change as their health changes. · In addition to making a living will, think about completing a medical power of  form. This form lets you name the person you want to make end-of-life treatment decisions for you (your \"health care agent\") if you're not able to. Many hospitals and nursing homes will give you the forms you need to complete a living will and a medical power of . · Your living will is used only if you can't make or communicate decisions for yourself anymore.  If you become able to make decisions again, you can accept or refuse any treatment, no matter what you wrote in your living will. · Your state may offer an online registry. This is a place where you can store your living will online so the doctors and nurses who need to treat you can find it right away. What should you think about when creating a living will? Talk about your end-of-life wishes with your family members and your doctor. Let them know what you want. That way the people making decisions for you won't be surprised by your choices. Think about these questions as you make your living will:  · Do you know enough about life support methods that might be used? If not, talk to your doctor so you know what might be done if you can't breathe on your own, your heart stops, or you're unable to swallow. · What things would you still want to be able to do after you receive life-support methods? Would you want to be able to walk? To speak? To eat on your own? To live without the help of machines? · If you have a choice, where do you want to be cared for? In your home? At a hospital or nursing home? · Do you want certain Samaritan practices performed if you become very ill? · If you have a choice at the end of your life, where would you prefer to die? At home? In a hospital or nursing home? Somewhere else? · Would you prefer to be buried or cremated? · Do you want your organs to be donated after you die? What should you do with your living will? · Make sure that your family members and your health care agent have copies of your living will. · Give your doctor a copy of your living will to keep in your medical record. If you have more than one doctor, make sure that each one has a copy. · You may want to put a copy of your living will where it can be easily found. Where can you learn more? Go to http://marie-nuno.info/. Enter D119 in the search box to learn more about \"Learning About Living Perzachary. \"  Current as of: August 8, 2016  Content Version: 11.3  © 5831-7700 [employer]. Care instructions adapted under license by [employer] (which disclaims liability or warranty for this information). If you have questions about a medical condition or this instruction, always ask your healthcare professional. [facility] any warranty or liability for your use of this information. Advance Directives: Care Instructions  Your Care Instructions  An advance directive is a legal way to state your wishes at the end of your life. It tells your family and your doctor what to do if you can no longer say what you want. There are two main types of advance directives. You can change them any time that your wishes change. · A living will tells your family and your doctor your wishes about life support and other treatment. · A durable power of  for health care lets you name a person to make treatment decisions for you when you can't speak for yourself. This person is called a health care agent. If you do not have an advance directive, decisions about your medical care may be made by a doctor or a  who doesn't know you. It may help to think of an advance directive as a gift to the people who care for you. If you have one, they won't have to make tough decisions by themselves. Follow-up care is a key part of your treatment and safety. Be sure to make and go to all appointments, and call your doctor if you are having problems. It's also a good idea to know your test results and keep a list of the medicines you take. How can you care for yourself at home? · Discuss your wishes with your loved ones and your doctor. This way, there are no surprises. · Many states have a unique form. Or you might use a universal form that has been approved by many states. This kind of form can sometimes be completed and stored online.  Your electronic copy will then be available wherever you have a connection to the Internet. In most cases, doctors will respect your wishes even if you have a form from a different state. · You don't need a  to do an advance directive. But you may want to get legal advice. · Think about these questions when you prepare an advance directive:  ¨ Who do you want to make decisions about your medical care if you are not able to? Many people choose a family member or close friend. ¨ Do you know enough about life support methods that might be used? If not, talk to your doctor so you understand. ¨ What are you most afraid of that might happen? You might be afraid of having pain, losing your independence, or being kept alive by machines. ¨ Where would you prefer to die? Choices include your home, a hospital, or a nursing home. ¨ Would you like to have information about hospice care to support you and your family? ¨ Do you want to donate organs when you die? ¨ Do you want certain Confucianism practices performed before you die? If so, put your wishes in the advance directive. · Read your advance directive every year, and make changes as needed. When should you call for help? Be sure to contact your doctor if you have any questions. Where can you learn more? Go to http://marie-nuno.info/. Enter R264 in the search box to learn more about \"Advance Directives: Care Instructions. \"  Current as of: November 17, 2016  Content Version: 11.3  © 1178-4739 [employer]. Care instructions adapted under license by [employer] (which disclaims liability or warranty for this information). If you have questions about a medical condition or this instruction, always ask your healthcare professional. [address] any warranty or liability for your use of this information.

## 2017-12-05 ENCOUNTER — OFFICE VISIT (OUTPATIENT)
Dept: NEUROLOGY | Age: 42
End: 2017-12-05

## 2017-12-05 VITALS
OXYGEN SATURATION: 99 % | WEIGHT: 201 LBS | RESPIRATION RATE: 17 BRPM | HEIGHT: 68 IN | SYSTOLIC BLOOD PRESSURE: 118 MMHG | DIASTOLIC BLOOD PRESSURE: 68 MMHG | BODY MASS INDEX: 30.46 KG/M2 | HEART RATE: 68 BPM

## 2017-12-05 DIAGNOSIS — G43.119 MIGRAINE WITH AURA, INTRACTABLE, WITHOUT STATUS MIGRAINOSUS: Primary | ICD-10-CM

## 2017-12-05 NOTE — PATIENT INSTRUCTIONS
Information Regarding Testing     If you have physican order for a test or a medication denied by your insurance company, this does not mean the test or medication is not appropriate for you as that is a medical decision, not a decision to be made by an insurance company representative or by an [facility] physician who has not interviewed and examined you. This is a decision to be made between you and your physician. The denial of services is a contractual matter between you and your insurance company, not an issue between your physician and the insurance company. If your test or medication is denied, you can take the following steps to help resolve the issue:    1. File a complaint with the [facility] regarding your insurance company's denial of services ordered for you. You can do this either by calling them directly or by completing an on-line complaint form on the [employer]. This can be found at www.[employer]    2. Also file a formal complaint with your insurance company and ask to have the name of the person denying the service so that you may explore a legal option should you be harmed by this denial of service. Again, the fact the insurance company will not pay for the service does not mean it is not medically necessary and I would encourage you to follow through with the plan that was made with your physician    3. File a written complaint with your employer so your employer and benefit manager is aware of the poor coverage they are providing their employees. If you have medicare/medicaid, complain to your representative in the House and to your Niki Reid.     10 [facility]   Statement to Patients  April 1, 2014      In an effort to ensure the large volume of patient prescription refills is processed in the most efficient and expeditious manner, we are asking our patients to assist us by calling your Pharmacy for all prescription refills, this will include also your  Mail Order Pharmacy. The pharmacy will contact our office electronically to continue the refill process. Please do not wait until the last minute to call your pharmacy. We need at least 48 hours (2days) to fill prescriptions. We also encourage you to call your pharmacy before going to  your prescription to make sure it is ready. With regard to controlled substance prescription refill requests (narcotic refills) that need to be picked up at our office, we ask your cooperation by providing us with at least 72 hours (3days) notice that you will need a refill. We will not refill narcotic prescription refill requests after 4:00pm on any weekday, Monday through Thursday, or after 2:00pm on Fridays, or on the weekends. We encourage everyone to explore another way of getting your prescription refill request processed using [employer], our patient web portal through our electronic medical record system. [employer] is an efficient and effective way to communicate your medication request directly to the office and  downloadable as an patti on your smart phone . [employer] also features a review functionality that allows you to view your medication list as well as leave messages for your physician. Are you ready to get connected? If so please review the attatched instructions or speak to any of our staff to get you set up right away! Thank you so much for your cooperation. Should you have any questions please contact our Practice Administrator. The Physicians and Staff,  [facility]       If we have ordered testing for you, we do not call patients with results and we do not give test results over the phone. We schedule follow up appointments so that your results can be discussed in person and any questions you have regarding them may be addressed.   If something of concern is revealed on your test, we will call you for a sooner follow up appointment. Additionally, results may be found by using the My Chart feature and one of our patient service representatives at the  can give you instructions on how to access this feature of our electronic medical record system. Learning About Living Evelia  What is a living will? A living will is a legal form you use to write down the kind of care you want at the end of your life. It is used by the health professionals who will treat you if you aren't able to decide for yourself. If you put your wishes in writing, your loved ones and others will know what kind of care you want. They won't need to guess. This can ease your mind and be helpful to others. A living will is not the same as an estate or property will. An estate will explains what you want to happen with your money and property after you die. Is a living will a legal document? A living will is a legal document. Each state has its own laws about living turner. If you move to another state, make sure that your living will is legal in the state where you now live. Or you might use a universal form that has been approved by many states. This kind of form can sometimes be completed and stored online. Your electronic copy will then be available wherever you have a connection to the Internet. In most cases, doctors will respect your wishes even if you have a form from a different state. · You don't need an  to complete a living will. But legal advice can be helpful if your state's laws are unclear, your health history is complicated, or your family can't agree on what should be in your living will. · You can change your living will at any time. Some people find that their wishes about end-of-life care change as their health changes. · In addition to making a living will, think about completing a medical power of  form.  This form lets you name the person you want to make end-of-life treatment decisions for you (your \"health care agent\") if you're not able to. Many hospitals and nursing homes will give you the forms you need to complete a living will and a medical power of . · Your living will is used only if you can't make or communicate decisions for yourself anymore. If you become able to make decisions again, you can accept or refuse any treatment, no matter what you wrote in your living will. · Your state may offer an online registry. This is a place where you can store your living will online so the doctors and nurses who need to treat you can find it right away. What should you think about when creating a living will? Talk about your end-of-life wishes with your family members and your doctor. Let them know what you want. That way the people making decisions for you won't be surprised by your choices. Think about these questions as you make your living will:  · Do you know enough about life support methods that might be used? If not, talk to your doctor so you know what might be done if you can't breathe on your own, your heart stops, or you're unable to swallow. · What things would you still want to be able to do after you receive life-support methods? Would you want to be able to walk? To speak? To eat on your own? To live without the help of machines? · If you have a choice, where do you want to be cared for? In your home? At a hospital or nursing home? · Do you want certain Jewish practices performed if you become very ill? · If you have a choice at the end of your life, where would you prefer to die? At home? In a hospital or nursing home? Somewhere else? · Would you prefer to be buried or cremated? · Do you want your organs to be donated after you die? What should you do with your living will? · Make sure that your family members and your health care agent have copies of your living will. · Give your doctor a copy of your living will to keep in your medical record.  If you have more than one doctor, make sure that each one has a copy. · You may want to put a copy of your living will where it can be easily found. Where can you learn more? Go to http://marie-nuno.info/. Enter L246 in the search box to learn more about \"Learning About Living Chelly Gamble. \"  Current as of: September 24, 2016  Content Version: 11.4  © 9236-8409 [employer]. Care instructions adapted under license by [employer] (which disclaims liability or warranty for this information). If you have questions about a medical condition or this instruction, always ask your healthcare professional. [address] any warranty or liability for your use of this information. Start Trokendi  for prevention of migraines, 25 mg nightly for 1 week then, 50 mg nightly for 1 week then, 75 mg nightly for 1 week then, 100mg nightly thereafter.  You will have 2 prescriptions at the pharmacy-- 1 for the 25 mg tabs and 1 for the 100 mg tabs    Use Onzetra to treat a migraine--1 ampule in each nostril at onset and repeat in 2 hours x 1 as needed

## 2017-12-05 NOTE — MR AVS SNAPSHOT
Visit Information Date & Time Provider Department Dept. Phone Encounter #  
 12/5/2017  2:00 PM Carole Santoro MD [facility] [phone] [id number] Follow-up Instructions Return in about 10 weeks (around 2/13/2018). Upcoming Health Maintenance Date Due DTaP/Tdap/Td series (1 - Tdap) 8/16/1998 PAP AKA CERVICAL CYTOLOGY 8/16/1998 Influenza Age 5 to Adult 8/1/2017 Allergies as of 12/5/2017  Review Complete On: 12/5/2017 By: Pito Uribe LPN No Known Allergies Current Immunizations  Never Reviewed Name Date Influenza Vaccine 10/25/2016 Not reviewed this visit You Were Diagnosed With   
  
 Codes Comments Migraine with aura, intractable, without status migrainosus    -  Primary ICD-10-CM: G43.119 ICD-9-CM: 346.01 Vitals BP Pulse Resp Height(growth percentile) Weight(growth percentile) SpO2  
 118/68 68 17 5' 8\" (1.727 m) 201 lb (91.2 kg) 99% BMI Smoking Status 30.56 kg/m2 Never Smoker Vitals History BMI and BSA Data Body Mass Index Body Surface Area 30.56 kg/m 2 2.09 m 2 Preferred Pharmacy Pharmacy Name Phone [facility] [address] [phone] Your Updated Medication List  
  
   
This list is accurate as of: 12/5/17  2:16 PM.  Always use your most recent med list.  
  
  
  
  
 ARMOUR THYROID 30 mg tablet Generic drug:  thyroid (Pork) 105 mcg daily. IRON PO Take  by mouth.  
  
 multivitamin tablet Commonly known as:  ONE A DAY Take 1 Tab by mouth daily. * SUMAtriptan succinate 11 mg Aepb Commonly known as:  Wynell Dove 11 mg by Nasal route as needed. * SUMAtriptan succinate 11 mg Aepb Commonly known as:  Wynell Dove 11 mg by Nasal route as needed. 1 at HA onset and repeat in 2 hours x1 if needed. Maximum 2 in 24 hours  Indications: Migraine SUMAtriptan-naproxen  mg tablet Commonly known as:  TREXIMET  
1 at HA onset and repeat in 2 hours x 1 prn  Max 2 in 24 hours SUPER B COMPLEX PO Take  by mouth. * topiramate ER 25 mg capsule Commonly known as:  TROKENDI XR Take 1 Cap by mouth daily. * topiramate ER 50 mg capsule Commonly known as:  TROKENDI XR Take 1 Cap by mouth daily. * topiramate  mg capsule Commonly known as:  TROKENDI XR Take 1 Cap by mouth daily. * topiramate  mg capsule Commonly known as:  TROKENDI XR  
1 po qd VITAMIN D3 PO Take  by mouth. * Notice: This list has 6 medication(s) that are the same as other medications prescribed for you. Read the directions carefully, and ask your doctor or other care provider to review them with you. Prescriptions Sent to Pharmacy Refills  
 topiramate ER (TROKENDI XR) 100 mg capsule 3 Si po qd Class: Normal  
 Pharmacy: [address] Ph #: [phone] SUMAtriptan succinate (ONZETRA XSAIL) 11 mg aepb 3 Si mg by Nasal route as needed. 1 at HA onset and repeat in 2 hours x1 if needed. Maximum 2 in 24 hours  Indications: Migraine Class: Normal  
 Pharmacy: [address] Ph #: [phone] Route: Nasal  
  
Follow-up Instructions Return in about 10 weeks (around 2018). Patient Instructions Information Regarding Testing If you have physican order for a test or a medication denied by your insurance company, this does not mean the test or medication is not appropriate for you as that is a medical decision, not a decision to be made by an insurance company representative or by an [facility] physician who has not interviewed and examined you. This is a decision to be made between you and your physician. The denial of services is a contractual matter between you and your insurance company, not an issue between your physician and the insurance company. If your test or medication is denied, you can take the following steps to help resolve the issue: 1. File a complaint with the [facility] of Insurance regarding your insurance company's denial of services ordered for you. You can do this either by calling them directly or by completing an on-line complaint form on the [employer]. This can be found at www.[place].Responsys 2. Also file a formal complaint with your insurance company and ask to have the name of the person denying the service so that you may explore a legal option should you be harmed by this denial of service. Again, the fact the insurance company will not pay for the service does not mean it is not medically necessary and I would encourage you to follow through with the plan that was made with your physician 3. File a written complaint with your employer so your employer and benefit manager is aware of the poor coverage they are providing their employees. If you have medicare/medicaid, complain to your representative in the House and to your Niki Reid. PRESCRIPTION REFILL POLICY [facility] Statement to Patients April 1, 2014 In an effort to ensure the large volume of patient prescription refills is processed in the most efficient and expeditious manner, we are asking our patients to assist us by calling your Pharmacy for all prescription refills, this will include also your  Mail Order Pharmacy. The pharmacy will contact our office electronically to continue the refill process. Please do not wait until the last minute to call your pharmacy. We need at least 48 hours (2days) to fill prescriptions. We also encourage you to call your pharmacy before going to  your prescription to make sure it is ready. With regard to controlled substance prescription refill requests (narcotic refills) that need to be picked up at our office, we ask your cooperation by providing us with at least 72 hours (3days) notice that you will need a refill. We will not refill narcotic prescription refill requests after 4:00pm on any weekday, Monday through Thursday, or after 2:00pm on Fridays, or on the weekends. We encourage everyone to explore another way of getting your prescription refill request processed using [employer], our patient web portal through our electronic medical record system. [employer] is an efficient and effective way to communicate your medication request directly to the office and  downloadable as an patti on your smart phone . [employer] also features a review functionality that allows you to view your medication list as well as leave messages for your physician. Are you ready to get connected? If so please review the attatched instructions or speak to any of our staff to get you set up right away! Thank you so much for your cooperation. Should you have any questions please contact our Practice Administrator. The Physicians and Staff,  [facility] If we have ordered testing for you, we do not call patients with results and we do not give test results over the phone. We schedule follow up appointments so that your results can be discussed in person and any questions you have regarding them may be addressed. If something of concern is revealed on your test, we will call you for a sooner follow up appointment. Additionally, results may be found by using the My Chart feature and one of our patient service representatives at the  can give you instructions on how to access this feature of our electronic medical record system. Estella Israel 1725 What is a living will?  
 
A living will is a legal form you use to write down the kind of care you want at the end of your life. It is used by the health professionals who will treat you if you aren't able to decide for yourself. If you put your wishes in writing, your loved ones and others will know what kind of care you want. They won't need to guess. This can ease your mind and be helpful to others. A living will is not the same as an estate or property will. An estate will explains what you want to happen with your money and property after you die. Is a living will a legal document? A living will is a legal document. Each state has its own laws about living turner. If you move to another state, make sure that your living will is legal in the state where you now live. Or you might use a universal form that has been approved by many states. This kind of form can sometimes be completed and stored online. Your electronic copy will then be available wherever you have a connection to the Internet. In most cases, doctors will respect your wishes even if you have a form from a different state. · You don't need an  to complete a living will. But legal advice can be helpful if your state's laws are unclear, your health history is complicated, or your family can't agree on what should be in your living will. · You can change your living will at any time. Some people find that their wishes about end-of-life care change as their health changes. · In addition to making a living will, think about completing a medical power of  form. This form lets you name the person you want to make end-of-life treatment decisions for you (your \"health care agent\") if you're not able to. Many hospitals and nursing homes will give you the forms you need to complete a living will and a medical power of . · Your living will is used only if you can't make or communicate decisions for yourself anymore.  If you become able to make decisions again, you can accept or refuse any treatment, no matter what you wrote in your living will. · Your state may offer an online registry. This is a place where you can store your living will online so the doctors and nurses who need to treat you can find it right away. What should you think about when creating a living will? Talk about your end-of-life wishes with your family members and your doctor. Let them know what you want. That way the people making decisions for you won't be surprised by your choices. Think about these questions as you make your living will: · Do you know enough about life support methods that might be used? If not, talk to your doctor so you know what might be done if you can't breathe on your own, your heart stops, or you're unable to swallow. · What things would you still want to be able to do after you receive life-support methods? Would you want to be able to walk? To speak? To eat on your own? To live without the help of machines? · If you have a choice, where do you want to be cared for? In your home? At a hospital or nursing home? · Do you want certain Mormon practices performed if you become very ill? · If you have a choice at the end of your life, where would you prefer to die? At home? In a hospital or nursing home? Somewhere else? · Would you prefer to be buried or cremated? · Do you want your organs to be donated after you die? What should you do with your living will? · Make sure that your family members and your health care agent have copies of your living will. · Give your doctor a copy of your living will to keep in your medical record. If you have more than one doctor, make sure that each one has a copy. · You may want to put a copy of your living will where it can be easily found. Where can you learn more? Go to http://marie-nuno.info/. Enter Y921 in the search box to learn more about \"Learning About Living Betty Huff. \" 
 Current as of: September 24, 2016 Content Version: 11.4 © 6833-8537 [employer]. Care instructions adapted under license by [employer] (which disclaims liability or warranty for this information). If you have questions about a medical condition or this instruction, always ask your healthcare professional. [address] any warranty or liability for your use of this information. Start Trokendi  for prevention of migraines, 25 mg nightly for 1 week then, 50 mg nightly for 1 week then, 75 mg nightly for 1 week then, 100mg nightly thereafter. You will have 2 prescriptions at the pharmacy-- 1 for the 25 mg tabs and 1 for the 100 mg tabs Use Onzetra to treat a migraine--1 ampule in each nostril at onset and repeat in 2 hours x 1 as needed Introducing [facility]! Dear Heather Mobley: Thank you for requesting a [employer] account. Our records indicate that you already have an active [employer] account. You can access your account anytime at [email or web address] Did you know that you can access your hospital and ER discharge instructions at any time in [employer]? You can also review all of your test results from your hospital stay or ER visit. Additional Information If you have questions, please visit the Frequently Asked Questions section of the [employer] website at [email or web address] Remember, [employer] is NOT to be used for urgent needs. For medical emergencies, dial 911. Now available from your iPhone and Android! Please provide this summary of care documentation to your next provider. Your primary care clinician is listed as Mary Sutherland MD. If you have any questions after today's visit, please call [phone].

## 2017-12-05 NOTE — PROGRESS NOTES
Follow up for headaches. Reports 15 plus headache days per month. No significant changes in headaches since last visit. No acute problems reported.

## 2017-12-05 NOTE — PROGRESS NOTES
Date:  17     Name:  Becky Medrano  :  1975  MRN:  [id number]     PCP:  Linnette Chahal MD    No chief complaint on file. HISTORY OF PRESENT ILLNESS Follow up for migraines. She continues to Keep track of her headaches on her calendar She provided us with a copy. She has been having about 15-16 migraines a month. She has associated photophobia and phonophobia nausea and at times will vomit. The treximet is working well usually after about 2 hours in taking it. She does not use the treximet for each headache but when she does it works well, but her headache comes back the next day. No other concerns. Except as noted above, denies  fever, chills, cough. No CP or SOB. No dysuria, loss of bowel or bladder control. No Weight loss. Appetite good. Sleeping well. No sweats. No edema. No bruising or bleeding. No nausea or vomit. No diarrhea. No frequency, urgency, No depressive sxs. No anxiety. Denies sore throat, nasal congestion, nasal discharge, epistaxis, tinnitus, hearing loss, back pain, muscle pain, or joint pain. Current Outpatient Prescriptions   Medication Sig    topiramate ER (TROKENDI XR) 25 mg capsule Take 1 Cap by mouth daily.  topiramate ER (TROKENDI XR) 50 mg capsule Take 1 Cap by mouth daily.  SUMAtriptan succinate (ONZETRA XSAIL) 11 mg aepb 11 mg by Nasal route as needed.  topiramate ER (TROKENDI XR) 100 mg capsule Take 1 Cap by mouth daily.  SUMAtriptan succinate (ONZETRA XSAIL) 11 mg aepb 11 mg by Nasal route as needed. Use ONZETRA for acute HA. 1 at HA onset and repeat in 2 hours x1 if needed. Maximum 2 in 24 hours  Indications: Migraine    topiramate ER (TROKENDI XR) 100 mg capsule 1 po qd    SUMAtriptan-naproxen (TREXIMET)  mg tablet 1 at HA onset and repeat in 2 hours x 1 prn  Max 2 in 24 hours    ARMOUR THYROID 30 mg tablet 105 mcg daily.  multivitamin (ONE A DAY) tablet Take 1 Tab by mouth daily.     CHOLECALCIFEROL, VITAMIN D3, (VITAMIN D3 PO) Take  by mouth.  FERROUS FUMARATE/VIT BCOMP,C (SUPER B COMPLEX PO) Take  by mouth.  FERROUS SULFATE (IRON PO) Take  by mouth. No current facility-administered medications for this visit. No Known Allergies  Past Medical History:   Diagnosis Date    Anxiety     Fatigue     Headache     Nausea     Ringing in ears     Thyroid disease     Vertigo     Visual disturbance      Past Surgical History:   Procedure Laterality Date    HX HEENT      right thyroid removal     Social History     Social History    Marital status:      Spouse name: N/A    Number of children: N/A    Years of education: N/A     Occupational History    Not on file. Social History Main Topics    Smoking status: Never Smoker    Smokeless tobacco: Never Used    Alcohol use No    Drug use: Not on file    Sexual activity: Not on file     Other Topics Concern    Not on file     Social History Narrative     Family History   Problem Relation Age of Onset    Headache Father     Heart Disease Father     Neuropathy Father     Stroke Father     Diabetes Father     Dementia Maternal Grandmother     Cancer Paternal Grandfather        PHYSICAL EXAMINATION:    Visit Vitals    /68    Pulse 68    Resp 17    Ht 5' 8\" (1.727 m)    Wt 91.2 kg (201 lb)    SpO2 99%    BMI 30.56 kg/m2     Neurologically, she is awake, alert, and oriented with normal speech and language. Her cognition is normal.  She is able to discuss her medical history. She is able to discuss her medications. She is able to discuss current events. Intact cranial nerves 2-12. No nystagmus. Visual fields full to confrontation. Disk margins are flat bilaterally. She has normal bulk and tone. She has no abnormal movement. She has no pronation or drift. She generates full strength in the upper and lower extremities to direct confrontational testing. Reflexes are symmetrical in the upper and lower extremities bilaterally. Her toes are down bilaterally. No Crump. Finger nose finger and rapid alternating movements are normal.  Steady gait. She is able to heel, toe, and tandem walk. No sensory deficit to primary modalities. No Romberg. ASSESSMENT AND PLAN    ICD-10-CM ICD-9-CM    1. Migraine with aura, intractable, without status migrainosus G43.119 346.01        The patient is having greater than 15 headache days a month which would warrant her to be on a preventative. Discussed preventatives and will start  Her on Trokendi in a customary fashion 25mg nightly for 1 week and increase by 25mg weekly until taking 100mg qhs. Discussed potential side effects including paresthesias, taste disturbance, and renal calculi. Also discussed the potential for weight loss. She was provided education on migraine today to include aura, prodrome, potential triggers, non-pharmacologic and pharmacologic treatment, and pathophysiology of a migraine. Written information was provided as well. She will track her headaches and bring the headache diary with her to her next office visit. She will continue Treximet for her acute therapy. Treximet got rid of her headache in 2 hours however she would have a headache the next day. I provided her samples of Onzetra to see if she gets better relief. Purpose and potential side effects were discussed and they have verbalized understanding. Follow up in 10 weeks . Fela Marcus NP     I have reviewed the documentation provided by the nurse practitioner, Ms. Maddi Palomares, and we have discussed her findings and the clinical impression. I have formulated with her the proposed management plans for this patient. Additionally,  I have personally evaluated the patient to verify the history and to confirm physical findings. Below are my additional comments:  Patient returns today for follow-up migraine headaches.   She continues to have an increased frequency of headaches and at present reports more than 15 headache days per month. Examination today finds her awake alert oriented and conversant. Normal speech and language. Intact cranial nerves II-XII. No nystagmus. No motor focality. Symmetric reflexes. No ataxia. She is overweight. Regular heart rate. No edema. We discussed preventatives. We will go ahead and start Trokendi. Discussed administration, potential benefits and potential side effects as well as alternatives. We will start a customary fashion. Track headaches. Bring that calendar to each appointment. Continue to use Treximet. We did discuss using Onzetra and demonstrated administration. This may give better relief  Follow in 10 weeks. Yosvany Parra MD  This note will not be viewable in [address]

## 2017-12-06 ENCOUNTER — TELEPHONE (OUTPATIENT)
Dept: NEUROLOGY | Age: 42
End: 2017-12-06

## 2017-12-07 NOTE — TELEPHONE ENCOUNTER
Return call placed to [facility], spoke to [place] and confirmed Baldemar Buddle should be 8 pouches.

## 2017-12-08 ENCOUNTER — TELEPHONE (OUTPATIENT)
Dept: NEUROLOGY | Age: 42
End: 2017-12-08

## 2017-12-08 NOTE — TELEPHONE ENCOUNTER
Received Zohreh GARCIA denial letter regarding Jethro Ever 11 mg #16/30 days     Reason: patient did not meet criteria Must try two (2) certain other drugs first  Generic sumatriptan nasal spray and one preferred oral triptan first      PA case closed unfavorable for patient    Ilsa Jenkins informed of this matter

## 2017-12-12 RX ORDER — SUMATRIPTAN 20 MG/1
SPRAY NASAL
Qty: 12 CONTAINER | Refills: 1 | Status: SHIPPED | OUTPATIENT
Start: 2017-12-12 | End: 2018-07-03

## 2017-12-12 NOTE — TELEPHONE ENCOUNTER
Spoke to patient and informed her of new order per provider. Script escribed to [facility] [employer] listed. Patient stated understanding.

## 2018-01-15 ENCOUNTER — PATIENT MESSAGE (OUTPATIENT)
Dept: NEUROLOGY | Age: 43
End: 2018-01-15

## 2018-01-15 ENCOUNTER — TELEPHONE (OUTPATIENT)
Dept: NEUROLOGY | Age: 43
End: 2018-01-15

## 2018-01-15 DIAGNOSIS — G43.119 MIGRAINE WITH AURA, INTRACTABLE, WITHOUT STATUS MIGRAINOSUS: ICD-10-CM

## 2018-01-15 NOTE — TELEPHONE ENCOUNTER
----- Message from Stuart Kuo sent at 1/15/2018 12:14 PM EST -----  Regarding: Dr. Anuradha Álvarez Refill/ Telephone  The pt needs a refill for her \"Treximet\" medication. This can be sent to the normal mail order pharmacy it is usually sent to. The pt is also requesting that the nurse contact her as well. She sent a message through the portal in December and never heard back. Best contact number is [phone].

## 2018-01-16 RX ORDER — SUMATRIPTAN AND NAPROXEN SODIUM 85; 500 MG/1; MG/1
TABLET, FILM COATED ORAL
Qty: 18 TAB | Refills: 5 | Status: SHIPPED | OUTPATIENT
Start: 2018-01-16 | End: 2018-01-17 | Stop reason: SDUPTHER

## 2018-01-17 ENCOUNTER — TELEPHONE (OUTPATIENT)
Dept: NEUROLOGY | Age: 43
End: 2018-01-17

## 2018-01-17 DIAGNOSIS — G43.119 MIGRAINE WITH AURA, INTRACTABLE, WITHOUT STATUS MIGRAINOSUS: ICD-10-CM

## 2018-01-17 RX ORDER — SUMATRIPTAN AND NAPROXEN SODIUM 85; 500 MG/1; MG/1
TABLET, FILM COATED ORAL
Qty: 18 TAB | Refills: 5 | Status: SHIPPED | OUTPATIENT
Start: 2018-01-17 | End: 2018-07-03 | Stop reason: SDUPTHER

## 2018-01-17 NOTE — TELEPHONE ENCOUNTER
----- Message from Yeimi Warner MD sent at 1/16/2018  8:56 AM EST -----  Regarding: FW: Prescription Question  Contact: [phone]  Ok to fill treximet    ----- Message -----     From: German Mohan LPN     Sent: 7/66/1830   8:32 AM       To: Yeimi Warner MD  Subject: FW: Prescription Question                            ----- Message -----     From: Lemuel Bermudez LPN     Sent: 1/05/8762   1:42 PM       To: German Mohan LPN  Subject: FW: Prescription Question                            ----- Message -----     From: [facility]     Sent: 1/15/2018  12:39 PM       To: [facility] Nurse [place]  Subject: Prescription Question                            Dr. Martinez Woodall,    I sent a message in December that the insurance had denied the new prescriptions you gave me. Also, the nasal spray was a little over $300 and I am unable to fill that. I took the Trokendi XR and experienced many side effects. When I got to week 4 (100mg), the side effects became worse. I had tingling/burning in my hands, sharp abdominal pains, and what I guess was some type of acid reflux/indigestion with severe burning sensations. Due to the side effects and denial by the insurance company, I have stopped taking this medication. I would like to just continue with the Treximet at this time, but do need a refill. It is through the mail-order pharmacy you originally sent it ([address]).

## 2018-01-26 NOTE — TELEPHONE ENCOUNTER
From: Lorie Huddleston  To: Sherita Menjivar MD  Sent: 1/15/2018 12:39 PM EST  Subject: Prescription Question    Dr. Jimena Mejía,    I sent a message in December that the insurance had denied the new prescriptions you gave me. Also, the nasal spray was a little over $300 and I am unable to fill that. I took the Trokendi XR and experienced many side effects. When I got to week 4 (100mg), the side effects became worse. I had tingling/burning in my hands, sharp abdominal pains, and what I guess was some type of acid reflux/indigestion with severe burning sensations. Due to the side effects and denial by the insurance company, I have stopped taking this medication. I would like to just continue with the Treximet at this time, but do need a refill. It is through the mail-order pharmacy you originally sent it ([address]).

## 2018-07-03 ENCOUNTER — OFFICE VISIT (OUTPATIENT)
Dept: NEUROLOGY | Age: 43
End: 2018-07-03

## 2018-07-03 VITALS
TEMPERATURE: 98.1 F | HEIGHT: 68 IN | BODY MASS INDEX: 30.31 KG/M2 | SYSTOLIC BLOOD PRESSURE: 98 MMHG | WEIGHT: 200 LBS | DIASTOLIC BLOOD PRESSURE: 65 MMHG | HEART RATE: 75 BPM | OXYGEN SATURATION: 98 % | RESPIRATION RATE: 16 BRPM

## 2018-07-03 DIAGNOSIS — G43.119 MIGRAINE WITH AURA, INTRACTABLE, WITHOUT STATUS MIGRAINOSUS: Primary | ICD-10-CM

## 2018-07-03 RX ORDER — MONTELUKAST SODIUM 10 MG/1
TABLET ORAL
Refills: 0 | COMMUNITY
Start: 2018-06-10 | End: 2019-11-12

## 2018-07-03 RX ORDER — CEPHALEXIN 500 MG/1
CAPSULE ORAL
COMMUNITY
End: 2018-07-03 | Stop reason: ALTCHOICE

## 2018-07-03 RX ORDER — SUMATRIPTAN AND NAPROXEN SODIUM 85; 500 MG/1; MG/1
TABLET, FILM COATED ORAL
Qty: 18 TAB | Refills: 5 | Status: SHIPPED | OUTPATIENT
Start: 2018-07-03 | End: 2019-05-31

## 2018-07-03 NOTE — PROGRESS NOTES
Chief Complaint   Patient presents with    Headache     follow up     1. Have you been to the ER, urgent care clinic since your last visit? Hospitalized since your last visit? No    2. Have you seen or consulted any other health care providers outside of the [address] [place] [address] since your last visit? Include any pap smears or colon screening.  No

## 2018-07-03 NOTE — PROGRESS NOTES
[address] [facility] [address] [address]   [place] [address]   [place]    [facility]   [phone] Fax               Chief Complaint   Patient presents with    Headache     follow up, pt states having about 10-12 headaches a month     Current Outpatient Prescriptions   Medication Sig Dispense Refill    montelukast (SINGULAIR) 10 mg tablet   0    SUMAtriptan-naproxen (TREXIMET)  mg tablet 1 at HA onset and repeat in 2 hours x 1 prn  Max 2 in 24 hours 18 Tab 5    ARMOUR THYROID 30 mg tablet 105 mcg daily. 0    multivitamin (ONE A DAY) tablet Take 1 Tab by mouth daily.  CHOLECALCIFEROL, VITAMIN D3, (VITAMIN D3 PO) Take  by mouth.  FERROUS FUMARATE/VIT BCOMP,C (SUPER B COMPLEX PO) Take  by mouth.  FERROUS SULFATE (IRON PO) Take  by mouth.  montelukast sodium (SINGULAIR PO) Singulair        No Known Allergies  Social History   Substance Use Topics    Smoking status: Never Smoker    Smokeless tobacco: Never Used    Alcohol use No     History of Present Illness: Ms. Thu Hackett returns today for follow up of migraine headaches. At last visit, I gave her some samples of Onzetra and we ordered that for her. She notes that the Cypriot Heysan worked well, but her insurance company would not pay for  It. In addition, we started her on Trokendi for prevention due to side effects with immediate release Topiramate. Again, her insurance company would not pay for that either. She was also having side effects with this so she discontinued. Right now, she is on no preventative medication. She uses for Treximet for abortive therapy. Looking at her headache diary, she is having anywhere from 6/11 migraines per month. Treximet is effective although she has some months where she gets close to running out. She has had to re-dose once in the last few months. No side effects are noted.      MedDATA/gwo            Examination  Visit Vitals    BP 98/65 (BP 1 Location: Left arm, BP Patient Position: Sitting)    Pulse 75    Temp 98.1 °F (36.7 °C) (Oral)    Resp 16    Ht 5' 8\" (1.727 m)    Wt 90.7 kg (200 lb)    LMP 06/15/2018 (Exact Date)    SpO2 98%    BMI 30.41 kg/m2     She is a very pleasant lady. She is alert, oriented and conversant. She has normal speech and language. No icterus, no edema. Impression/Plan  Intractable migraine headaches with failure of preventatives in the past both from lack of efficacy as well as side effect. Discussed Aimovig today. Discussed the mechanism of action, demonstrated the injector, discussed the studies, etc.  We will start Aimovig 70 mg once monthly, track headaches as she is. Continue Treximet for abortive therapy. Discussed other preventatives that have been tried. Follow up in three months and that way it gives her a month to get the drug through the specialty hub which as I understand is a little backed up at this time and then enough time for her to be on medication for two months to see how it is doing. We did discuss the new studies. People are getting a dramatic reduction in their headache frequency in one month. We discussed the type of physiology of migraine and we discussed the pharmacokinetics of Aimovig as well. Follow up in three months. Galina Keller MD  Total time: 25 min   Counseling / coordination time: 15 min   > 50% counseling / coordination?: Yes re: as documented above          This note was created using voice recognition software. Despite editing, there may be syntax errors. This note will not be viewable in [address]

## 2018-07-03 NOTE — MR AVS SNAPSHOT
[address] [place] [address] 
[phone] Patient: Tegan Ewing MRN: [id number] :[id number] Visit Information Date & Time Provider Department Dept. Phone Encounter #  
 7/3/2018  2:00 PM Radha Larose [address] [place] [address] Neurology Clinic [phone] [id number] Follow-up Instructions Return in about 3 months (around 10/3/2018). Follow-up and Disposition History Upcoming Health Maintenance Date Due DTaP/Tdap/Td series (1 - Tdap) 1998 PAP AKA CERVICAL CYTOLOGY 1998 Influenza Age 5 to Adult 2018 Allergies as of 7/3/2018  Review Complete On: 7/3/2018 By: Jose R Thompson MD  
 No Known Allergies Current Immunizations  Never Reviewed Name Date Influenza Vaccine 10/25/2016 Not reviewed this visit You Were Diagnosed With   
  
 Codes Comments Migraine with aura, intractable, without status migrainosus    -  Primary ICD-10-CM: G43.119 ICD-9-CM: 346.01 Vitals BP Pulse Temp Resp Height(growth percentile) Weight(growth percentile) 98/65 (BP 1 Location: Left arm, BP Patient Position: Sitting) 75 98.1 °F (36.7 °C) (Oral) 16 5' 8\" (1.727 m) 200 lb (90.7 kg) LMP SpO2 BMI OB Status Smoking Status 06/15/2018 (Exact Date) 98% 30.41 kg/m2 Having regular periods Never Smoker Vitals History BMI and BSA Data Body Mass Index Body Surface Area  
 30.41 kg/m 2 2.09 m 2 Preferred Pharmacy Pharmacy Name Phone [address], [address] [place] [address] [phone] Your Updated Medication List  
  
   
This list is accurate as of 7/3/18  2:31 PM.  Always use your most recent med list.  
  
  
  
  
 HUGH THYROID 30 mg tablet Generic drug:  thyroid (Pork) 105 mcg daily. IRON PO Take  by mouth.  
  
 multivitamin tablet Commonly known as:  ONE A DAY  
 Take 1 Tab by mouth daily. * SINGULAIR PO Singulair * montelukast 10 mg tablet Commonly known as:  SINGULAIR  
  
 SUMAtriptan-naproxen  mg tablet Commonly known as:  TREXIMET  
1 at HA onset and repeat in 2 hours x 1 prn  Max 2 in 24 hours SUPER B COMPLEX PO Take  by mouth. VITAMIN D3 PO Take  by mouth. * Notice: This list has 2 medication(s) that are the same as other medications prescribed for you. Read the directions carefully, and ask your doctor or other care provider to review them with you. Prescriptions Sent to Pharmacy Refills SUMAtriptan-naproxen (TREXIMET)  mg tablet 5 Si at HA onset and repeat in 2 hours x 1 prn  Max 2 in 24 hours Class: Normal  
 Pharmacy: [id number] [facility] or [facility] #: [phone] Follow-up Instructions Return in about 3 months (around 10/3/2018). Introducing [facility]! Dear [facility]: Thank you for requesting a [employer] account. Our records indicate that you already have an active [employer] account. You can access your account anytime at [email or web address] Did you know that you can access your hospital and ER discharge instructions at any time in [employer]? You can also review all of your test results from your hospital stay or ER visit. Additional Information If you have questions, please visit the Frequently Asked Questions section of the [employer] website at [email or web address] Remember, [employer] is NOT to be used for urgent needs. For medical emergencies, dial 911. Now available from your iPhone and Android! Please provide this summary of care documentation to your next provider. Your primary care clinician is listed as Dewey Navarro MD. If you have any questions after today's visit, please call [phone].

## 2018-07-11 DIAGNOSIS — G43.119 INTRACTABLE MIGRAINE WITH AURA WITHOUT STATUS MIGRAINOSUS: Primary | ICD-10-CM

## 2018-12-31 ENCOUNTER — OFFICE VISIT (OUTPATIENT)
Dept: NEUROLOGY | Age: 43
End: 2018-12-31

## 2018-12-31 ENCOUNTER — TELEPHONE (OUTPATIENT)
Dept: NEUROLOGY | Age: 43
End: 2018-12-31

## 2018-12-31 VITALS
RESPIRATION RATE: 12 BRPM | WEIGHT: 190 LBS | HEART RATE: 86 BPM | DIASTOLIC BLOOD PRESSURE: 68 MMHG | SYSTOLIC BLOOD PRESSURE: 116 MMHG | OXYGEN SATURATION: 99 % | BODY MASS INDEX: 28.89 KG/M2

## 2018-12-31 DIAGNOSIS — F41.9 ANXIETY DISORDER, UNSPECIFIED TYPE: Primary | ICD-10-CM

## 2018-12-31 DIAGNOSIS — M54.2 NECK PAIN: ICD-10-CM

## 2018-12-31 DIAGNOSIS — G43.119 MIGRAINE WITH AURA, INTRACTABLE, WITHOUT STATUS MIGRAINOSUS: Primary | ICD-10-CM

## 2018-12-31 NOTE — TELEPHONE ENCOUNTER
----- Message from Spike Sutherland sent at 12/31/2018 11:37 AM EST -----  Regarding: Dr Hidalgo/rx  Pt's p) [phone],CG is due to have an MRI next Thursday 1/10/19, and will need  One  pill for xanax called into her  [facility], the one listed in her chart.

## 2018-12-31 NOTE — PROGRESS NOTES
Pt had stretch of migraines for a week, went to ER 21st, could not get migraine to break. Was given IV infusion, and fiorecet. Did lab work also.

## 2018-12-31 NOTE — PROGRESS NOTES
Date:  18     Name:  Janeen Arrieta  :  1975  MRN:  [id number]     PCP:  Mariah Zamora MD    No chief complaint on file. HISTORY OF PRESENT ILLNESS:Follow up visit for migraines. The last visit the patient was started on aimovig 70 mg monthly however she was unable to start. She reports that she called the office several times and no one got back to her about the medication. Last week patient reports that she had a migraine that lasted for a week. She went to the emergency room it was giving Toradol infusion which aborted her headache for a day. She now reports that she continues to get about 3 headache days a week. She is also interested in starting the new migraine medication classification CRGP. In addition to her headaches, she reports an increase in neck pain. She notes that her neck pain will start in her shoulders and radiate up. She denies any injury. Denies imaging. She is working with a chiropractor but has discontinued treatment due to her increase in headaches. Except as noted above, denies  fever, chills, cough. No CP or SOB. No dysuria, loss of bowel or bladder control. No Weight loss. Appetite good. Sleeping well. No sweats. No edema. No bruising or bleeding. No nausea or vomit. No diarrhea. No frequency, urgency, No depressive sxs. No anxiety. Denies sore throat, nasal congestion, nasal discharge, epistaxis, tinnitus, hearing loss, back pain, muscle pain, or joint pain. Current Outpatient Medications   Medication Sig    erenumab-aooe (AIMOVIG AUTOINJECTOR) 70 mg/mL atIn 1 Syringe by SubCUTAneous route every month.  montelukast (SINGULAIR) 10 mg tablet     SUMAtriptan-naproxen (TREXIMET)  mg tablet 1 at HA onset and repeat in 2 hours x 1 prn  Max 2 in 24 hours    ARMOUR THYROID 30 mg tablet 105 mcg daily.  multivitamin (ONE A DAY) tablet Take 1 Tab by mouth daily.  CHOLECALCIFEROL, VITAMIN D3, (VITAMIN D3 PO) Take  by mouth.  FERROUS FUMARATE/VIT BCOMP,C (SUPER B COMPLEX PO) Take  by mouth.  FERROUS SULFATE (IRON PO) Take  by mouth.  erenumab-aooe (AIMOVIG AUTOINJECTOR) 70 mg/mL atIn 70 mg PE by SubCUTAneous route every thirty (30) days.  montelukast sodium (SINGULAIR PO) Singulair     No current facility-administered medications for this visit. No Known Allergies  Past Medical History:   Diagnosis Date    Anxiety     Fatigue     Headache     Nausea     Ringing in ears     Thyroid disease     Vertigo     Visual disturbance      Past Surgical History:   Procedure Laterality Date    HX HEENT      right thyroid removal     Social History     Socioeconomic History    Marital status:      Spouse name: Not on file    Number of children: Not on file    Years of education: Not on file    Highest education level: Not on file   Social Needs    Financial resource strain: Not on file    Food insecurity - worry: Not on file    Food insecurity - inability: Not on file   [employer] needs - medical: Not on file   [employer] needs - non-medical: Not on file   Occupational History    Not on file   Tobacco Use    Smoking status: Never Smoker    Smokeless tobacco: Never Used   Substance and Sexual Activity    Alcohol use: No    Drug use: Not on file    Sexual activity: Not on file   Other Topics Concern    Not on file   Social History Narrative    Not on file     Family History   Problem Relation Age of Onset    Headache Father     Heart Disease Father     Neuropathy Father     Stroke Father     Diabetes Father     Dementia Maternal Grandmother     Cancer Paternal Grandfather        PHYSICAL EXAMINATION:    Visit Vitals  /68   Pulse 86   Resp 12   Wt 86.2 kg (190 lb)   SpO2 99%   BMI 28.89 kg/m²     General: Well defined, nourished, and groomed individual in no acute distress. Neck: Supple, nontender, no bruits, no pain with resistance to active range of motion.    Heart: Regular rate and rhythm, no murmurs, rub, or gallop. Normal S1S2. Lungs: Clear to auscultation bilaterally with equal chest expansion, no cough, no wheeze  Musculoskeletal: Extremities revealed no edema and had full range of motion of joints. Psych: Good mood and bright affect      NEUROLOGICAL EXAMINATION:   Mental Status: Alert and oriented to person, place, and time       Cranial Nerves:   II, III, IV, VI: Visual acuity grossly intact. Visual fields are normal.   Pupils are equal, round, and reactive to light and accommodation. Extra-ocular movements are full and fluid. Fundoscopic exam was benign, no ptosis or nystagmus. V-XII: Hearing is grossly intact. Facial features are symmetric, with normal sensation and strength. The palate rises symmetrically and the tongue protrudes midline. Sternocleidomastoids 5/5.       Motor Examination: Normal tone, bulk, and strength, 5/5 muscle strength throughout.       Coordination: No resting or intention tremor      Gait and Station: Steady while walking. Normal arm swing. No pronator drift. No muscle wasting or fasiculations noted.       Reflexes: DTRs 2+ throughout. ASSESSMENT AND PLAN    ICD-10-CM ICD-9-CM    1. Migraine with aura, intractable, without status migrainosus G43.119 346.01 erenumab-aooe (AIMOVIG AUTOINJECTOR) 70 mg/mL atIn   2. Neck pain M54.2 723.1 MRI CERV SPINE W WO CONT   Migraines   Discussed all 3 CRGP purpose and side effects  1. We will start Aimovig 70 mg injection Q month. Purpose and potential side effects were discussed and  She have verbalized understanding. She will e able to  sample at [facility] if needed   2. Track headaches on a calendar and bring to each appointment. 3. Continue Treximet to abort headaches   Neck Pain   Being that she is starting to develop an increased amount of neck pain. I will proceed with getting an MRI of the cervical spine to evaluate any structural abnormalities.   Recommend she hold off on seeing the chiropractor until after imaging has been completed      Harsh Jung, NP

## 2019-01-01 RX ORDER — ALPRAZOLAM 1 MG/1
TABLET ORAL
Qty: 1 TAB | Refills: 0 | Status: SHIPPED | OUTPATIENT
Start: 2019-01-01 | End: 2019-02-19 | Stop reason: ALTCHOICE

## 2019-01-10 ENCOUNTER — HOSPITAL ENCOUNTER (OUTPATIENT)
Dept: MRI IMAGING | Age: 44
Discharge: HOME | End: 2019-01-10
Attending: NURSE PRACTITIONER
Payer: COMMERCIAL

## 2019-01-10 DIAGNOSIS — M54.2 NECK PAIN: ICD-10-CM

## 2019-01-10 PROCEDURE — 74011250636 HC RX REV CODE- 250/636: Performed by: RADIOLOGY

## 2019-01-10 PROCEDURE — A9575 INJ GADOTERATE MEGLUMI 0.1ML: HCPCS | Performed by: RADIOLOGY

## 2019-01-10 PROCEDURE — 72156 MRI NECK SPINE W/O & W/DYE: CPT

## 2019-01-10 RX ORDER — GADOTERATE MEGLUMINE 376.9 MG/ML
15 INJECTION INTRAVENOUS
Status: COMPLETED | OUTPATIENT
Start: 2019-01-10 | End: 2019-01-10

## 2019-01-10 RX ADMIN — GADOTERATE MEGLUMINE 15 ML: 376.9 INJECTION INTRAVENOUS at 17:41

## 2019-02-19 ENCOUNTER — OFFICE VISIT (OUTPATIENT)
Dept: NEUROLOGY | Age: 44
End: 2019-02-19

## 2019-02-19 VITALS
RESPIRATION RATE: 14 BRPM | WEIGHT: 186 LBS | HEART RATE: 77 BPM | BODY MASS INDEX: 28.19 KG/M2 | DIASTOLIC BLOOD PRESSURE: 66 MMHG | HEIGHT: 68 IN | OXYGEN SATURATION: 98 % | SYSTOLIC BLOOD PRESSURE: 112 MMHG

## 2019-02-19 DIAGNOSIS — M54.2 NECK PAIN: ICD-10-CM

## 2019-02-19 DIAGNOSIS — G43.119 MIGRAINE WITH AURA, INTRACTABLE, WITHOUT STATUS MIGRAINOSUS: Primary | ICD-10-CM

## 2019-02-19 NOTE — PROGRESS NOTES
Chief Complaint Patient presents with  Migraine 3 mo f/u. Started Con [place] on 1/12/19, 2nd dose was last week. !2 HA last mo, this month 4 so far. MRI c-spine done on 1/10/19. and would like results Coordination of Care Questions 1. Have you been to the ER, urgent care clinic outside of [facility] since your last visit? No  
    Hospitalized since your last visit? No 
 
2. Have you seen or consulted any other health care providers outside of the [address] [place] [address] since your last visit? Include any pap smears or colon screening.  No

## 2019-02-27 NOTE — PROGRESS NOTES
[facility] and [address] at [facility] Neurology Clinics at [facility] [address] [place], [address] [place] [address] [address] [facility], [address] [place] [address]  
[phone] Chief Complaint Patient presents with  Migraine 3 mo f/u. Started Nalini Forbeser on 1/12/19, 2nd dose was last week. !2 HA last mo, this month 4 so far. MRI c-spine done on 1/10/19. and would like results Current Outpatient Medications Medication Sig Dispense Refill  levonorgestrel (MIRENA) 20 mcg/24 hr (5 years) IUD 1 Device by IntraUTERine route once.  erenumab-aooe (AIMOVIG AUTOINJECTOR) 70 mg/mL atIn 70 mg PE by SubCUTAneous route every thirty (30) days. 1 Syringe 5  
 montelukast sodium (SINGULAIR PO) Singulair  montelukast (SINGULAIR) 10 mg tablet   0  
 SUMAtriptan-naproxen (TREXIMET)  mg tablet 1 at HA onset and repeat in 2 hours x 1 prn  Max 2 in 24 hours 18 Tab 5  ARMOUR THYROID 30 mg tablet 105 mcg daily. 0  
 multivitamin (ONE A DAY) tablet Take 1 Tab by mouth daily.  CHOLECALCIFEROL, VITAMIN D3, (VITAMIN D3 PO) Take  by mouth.  FERROUS FUMARATE/VIT BCOMP,C (SUPER B COMPLEX PO) Take  by mouth.  FERROUS SULFATE (IRON PO) Take  by mouth. No Known Allergies Social History Tobacco Use  Smoking status: Never Smoker  Smokeless tobacco: Never Used Substance Use Topics  Alcohol use: No  
 Drug use: Not on file Patient returns today for follow-up migraine headaches and neck pain. She saw our nurse practitioner, Landry, on her last visit. At that visit, she started Aimovig with her first dose January 12. She just took her second dose last week. She has not had a full 3 dose regimen yet to really  full efficacy. She has had a good initial result however.   She had 12 headaches in January and only for headaches in February however which demonstrates a significant decline. MRI of the cervical spine is personally reviewed and she has no significant abnormality there. We discussed that today. She has tolerated the Aimovig without any difficulty. She is happy with how she is doing. Examination Visit Vitals /66 (BP 1 Location: Left arm, BP Patient Position: Sitting) Pulse 77 Resp 14 Ht 5' 8\" (1.727 m) Wt 84.4 kg (186 lb) LMP 12/16/2018 (Exact Date) SpO2 98% BMI 28.28 kg/m² On examination today she looks well. She is awake alert oriented and conversant. Normal speech-language cognition and attention. Intact cranial nerves II-XII. No nystagmus. No motor focality. No ataxia. Steady gait. Impression/Plan Intractable migraines better after the initiation of Aimovig. We will continue this at the 70 mg dose for now. Continue to track headaches Neck pain with unremarkable MRI. We discussed that given the MRI showed no abnormality I would be fine with her doing some chiropractic. Follow-up in about 2 months Mirela Ramos MD 
 
 
This note was created using voice recognition software. Despite editing, there may be syntax errors. This note will not be viewable in [address]

## 2019-03-19 ENCOUNTER — TELEPHONE (OUTPATIENT)
Dept: NEUROLOGY | Age: 44
End: 2019-03-19

## 2019-03-19 NOTE — TELEPHONE ENCOUNTER
PA Submitted and APPROVED for Aimovig 70mg. Effective dates unknown. Case # [id number]. Cover My meds Key [id number].  Please send to pharmacy if neccessary

## 2019-05-31 ENCOUNTER — TELEPHONE (OUTPATIENT)
Dept: NEUROLOGY | Age: 44
End: 2019-05-31

## 2019-05-31 DIAGNOSIS — G43.119 MIGRAINE WITH AURA, INTRACTABLE, WITHOUT STATUS MIGRAINOSUS: ICD-10-CM

## 2019-05-31 DIAGNOSIS — G43.119 INTRACTABLE MIGRAINE WITH AURA WITHOUT STATUS MIGRAINOSUS: ICD-10-CM

## 2019-05-31 RX ORDER — SUMATRIPTAN AND NAPROXEN SODIUM 85; 500 MG/1; MG/1
TABLET, FILM COATED ORAL
Qty: 18 TAB | Refills: 5 | Status: SHIPPED | OUTPATIENT
Start: 2019-05-31 | End: 2019-07-05 | Stop reason: SDUPTHER

## 2019-05-31 NOTE — TELEPHONE ENCOUNTER
Pt stated that she needs a refill on her     SUMAtriptan-naproxen (TREXIMET)  mg tablet    erenumab-aooe (AIMOVIG AUTOINJECTOR) 70 mg/mL atIn

## 2019-06-18 ENCOUNTER — TELEPHONE (OUTPATIENT)
Dept: NEUROLOGY | Age: 44
End: 2019-06-18

## 2019-06-18 NOTE — TELEPHONE ENCOUNTER
PA submitted for Sumatriptan-Naproxen to Baljinder Mckeon via Cover my Meds. Status Pending. Allow 24-72 hours for response.       CM Key: [id number]   Case #: [id number]

## 2019-06-19 NOTE — TELEPHONE ENCOUNTER
PA APPROVED for Sumatriptan-Naproxen 85-500mg by Zohreh. Effective dates 06/18/19 - 06/17/20. Case # [id number]. Please send med to Pharmacy if necessary. Approval scanned into media for review.

## 2019-07-05 DIAGNOSIS — G43.119 MIGRAINE WITH AURA, INTRACTABLE, WITHOUT STATUS MIGRAINOSUS: ICD-10-CM

## 2019-07-08 RX ORDER — SUMATRIPTAN AND NAPROXEN SODIUM 85; 500 MG/1; MG/1
TABLET, FILM COATED ORAL
Qty: 18 TAB | Refills: 5 | Status: SHIPPED | OUTPATIENT
Start: 2019-07-08 | End: 2019-10-16 | Stop reason: SDUPTHER

## 2019-07-08 NOTE — TELEPHONE ENCOUNTER
----- Message from Mari Epstein sent at 7/5/2019  4:27 PM EDT -----  Regarding: Dr. Mandeep Ornelas  Pt requesting refill for Rx \"Treximet 85/500 mg\" sent to [address] [facility] order pharmacy on file.  Best contact [phone].XTPX

## 2019-07-08 NOTE — TELEPHONE ENCOUNTER
Treximet  mg Si at HA onset and repeat in 2 hours x 1 prn  Max 2 in 24 hours was sent to [facility] aid on 19 # 18 with 5 refills. Pt wanted this sent to [address] [place] [address]. This has been approved and sent to the correct pharmacy as this is a current rx.

## 2019-10-16 DIAGNOSIS — G43.119 MIGRAINE WITH AURA, INTRACTABLE, WITHOUT STATUS MIGRAINOSUS: ICD-10-CM

## 2019-10-16 RX ORDER — SUMATRIPTAN AND NAPROXEN SODIUM 85; 500 MG/1; MG/1
TABLET, FILM COATED ORAL
Qty: 9 TAB | Refills: 5 | Status: SHIPPED | OUTPATIENT
Start: 2019-10-16 | End: 2020-03-23 | Stop reason: SDUPTHER

## 2019-11-12 ENCOUNTER — OFFICE VISIT (OUTPATIENT)
Dept: NEUROLOGY | Age: 44
End: 2019-11-12

## 2019-11-12 VITALS
RESPIRATION RATE: 16 BRPM | BODY MASS INDEX: 29.4 KG/M2 | SYSTOLIC BLOOD PRESSURE: 106 MMHG | HEIGHT: 68 IN | DIASTOLIC BLOOD PRESSURE: 76 MMHG | WEIGHT: 194 LBS | OXYGEN SATURATION: 98 % | HEART RATE: 80 BPM

## 2019-11-12 DIAGNOSIS — G43.119 MIGRAINE WITH AURA, INTRACTABLE, WITHOUT STATUS MIGRAINOSUS: Primary | ICD-10-CM

## 2019-11-12 NOTE — PROGRESS NOTES
[facility] Neurology Clinics and [address] at [facility] Neurology Clinics at [address] [facility], [address] [facility] [address] [facility], [address] [place] [address]   [phone]              Chief Complaint   Patient presents with    Migraine     brought calendar, not taking Aimovig d/t insurance change and high deductible. did not get copay card. when taking inj, was getting maybe 7 per mo, now had 6 just in 12 days this month. Current Outpatient Medications   Medication Sig Dispense Refill    SUMAtriptan-naproxen (TREXIMET)  mg tablet TAKE 1 TABLET BY MOUTH AT ONSET OF MIGRAINE, MAY REPEAT IN 2 HOURS IF NEEDED; DO NOT EXCEED 2 TABLETS/24HRS 9 Tab 5    levonorgestrel (MIRENA) 20 mcg/24 hr (5 years) IUD 1 Device by IntraUTERine route once.  ARMOUR THYROID 30 mg tablet Take 105 mcg by mouth daily. 0    multivitamin (ONE A DAY) tablet Take 1 Tab by mouth daily.  CHOLECALCIFEROL, VITAMIN D3, (VITAMIN D3 PO) Take  by mouth.  FERROUS FUMARATE/VIT BCOMP,C (SUPER B COMPLEX PO) Take  by mouth.  FERROUS SULFATE (IRON PO) Take  by mouth.  erenumab-aooe (AIMOVIG AUTOINJECTOR) 70 mg/mL injection 1 mL by SubCUTAneous route every thirty (30) days. 1 Syringe 5      No Known Allergies  Social History     Tobacco Use    Smoking status: Never Smoker    Smokeless tobacco: Never Used   Substance Use Topics    Alcohol use: No    Drug use: Not on file     Patient returns today for follow-up intractable migraine headache. Her last visit was February. She was doing well with Aimovig at that time. She returns today and due to insurance and co-pay issues she is now on Aimovig. Headaches have escalated back to where they were. She tolerated the medicine without difficulty. Review of her headache diary finds that she has already had 7 headache days in November.     Examination  Visit Vitals  Ht 5' 8\" (1.727 m)   Wt 88 kg (194 lb)   BMI 29.50 kg/m²     Awake, alert and oriented. No icterus. CN intact 2-12 without nystagmus. No pronation or drift. Resists fully in all 4 extrems. DTR symmetric in all 4 extremities. No ataxia. Steady gait. Impression/Plan  Intractable migraine headaches worse off of Aimovig and even with Aimovig she was having 7/month which was improved and that it was decreased by half but certainly I think we should increase to the 140 mg dose. We gave her a co-pay card. We discussed that she needs to have treatment for status migrainosus which she typically gets around this time of year where she can go 5 or 6 days with unremitting headache then she should use dispatch health we gave her information in that regard. 140 mg Aimovig sent to her pharmacy. Co-pay card given to her as well. Follow-up in 3 months    Total time: 25 min   Counseling / coordination time: 15 min   > 50% counseling / coordination?: Yes re: discussing migraines, preventatives, Aimovig, headache frequency, emergency treatment etc.      Barrera Byrd MD      This note was created using voice recognition software. Despite editing, there may be syntax errors.

## 2020-03-07 ENCOUNTER — HOSPITAL ENCOUNTER (EMERGENCY)
Age: 45
Discharge: HOME | End: 2020-03-07
Attending: EMERGENCY MEDICINE | Admitting: EMERGENCY MEDICINE
Payer: COMMERCIAL

## 2020-03-07 VITALS
RESPIRATION RATE: 18 BRPM | DIASTOLIC BLOOD PRESSURE: 74 MMHG | HEIGHT: 67 IN | HEART RATE: 72 BPM | TEMPERATURE: 98.1 F | OXYGEN SATURATION: 100 % | SYSTOLIC BLOOD PRESSURE: 127 MMHG | WEIGHT: 190 LBS | BODY MASS INDEX: 29.82 KG/M2

## 2020-03-07 DIAGNOSIS — G43.809 OTHER MIGRAINE WITHOUT STATUS MIGRAINOSUS, NOT INTRACTABLE: Primary | ICD-10-CM

## 2020-03-07 LAB
ALBUMIN SERPL-MCNC: 4.4 G/DL (ref 3.5–5)
ALBUMIN/GLOB SERPL: 1.3 {RATIO} (ref 1.1–2.2)
ALP SERPL-CCNC: 82 U/L (ref 45–117)
ALT SERPL-CCNC: 27 U/L (ref 12–78)
AMORPH CRY URNS QL MICRO: ABNORMAL
ANION GAP SERPL CALC-SCNC: 6 MMOL/L (ref 5–15)
APPEARANCE UR: ABNORMAL
AST SERPL-CCNC: 13 U/L (ref 15–37)
BACTERIA URNS QL MICRO: ABNORMAL /HPF
BASOPHILS # BLD: 0.1 K/UL (ref 0–0.1)
BASOPHILS NFR BLD: 1 % (ref 0–1)
BILIRUB SERPL-MCNC: 0.6 MG/DL (ref 0.2–1)
BILIRUB UR QL: NEGATIVE
BUN SERPL-MCNC: 16 MG/DL (ref 6–20)
BUN/CREAT SERPL: 21 (ref 12–20)
CALCIUM SERPL-MCNC: 9.3 MG/DL (ref 8.5–10.1)
CHLORIDE SERPL-SCNC: 105 MMOL/L (ref 97–108)
CO2 SERPL-SCNC: 29 MMOL/L (ref 21–32)
COLOR UR: ABNORMAL
COMMENT, HOLDF: NORMAL
CREAT SERPL-MCNC: 0.76 MG/DL (ref 0.55–1.02)
DIFFERENTIAL METHOD BLD: NORMAL
EOSINOPHIL # BLD: 0.1 K/UL (ref 0–0.4)
EOSINOPHIL NFR BLD: 2 % (ref 0–7)
EPITH CASTS URNS QL MICRO: ABNORMAL /LPF
ERYTHROCYTE [DISTWIDTH] IN BLOOD BY AUTOMATED COUNT: 12.9 % (ref 11.5–14.5)
GLOBULIN SER CALC-MCNC: 3.3 G/DL (ref 2–4)
GLUCOSE SERPL-MCNC: 84 MG/DL (ref 65–100)
GLUCOSE UR STRIP.AUTO-MCNC: NEGATIVE MG/DL
HCG UR QL: NEGATIVE
HCT VFR BLD AUTO: 46.4 % (ref 35–47)
HGB BLD-MCNC: 15.1 G/DL (ref 11.5–16)
HGB UR QL STRIP: NEGATIVE
IMM GRANULOCYTES # BLD AUTO: 0 K/UL (ref 0–0.04)
IMM GRANULOCYTES NFR BLD AUTO: 0 % (ref 0–0.5)
KETONES UR QL STRIP.AUTO: NEGATIVE MG/DL
LEUKOCYTE ESTERASE UR QL STRIP.AUTO: NEGATIVE
LYMPHOCYTES # BLD: 1.5 K/UL (ref 0.8–3.5)
LYMPHOCYTES NFR BLD: 23 % (ref 12–49)
MCH RBC QN AUTO: 29.5 PG (ref 26–34)
MCHC RBC AUTO-ENTMCNC: 32.5 G/DL (ref 30–36.5)
MCV RBC AUTO: 90.6 FL (ref 80–99)
MONOCYTES # BLD: 0.5 K/UL (ref 0–1)
MONOCYTES NFR BLD: 8 % (ref 5–13)
NEUTS SEG # BLD: 4.2 K/UL (ref 1.8–8)
NEUTS SEG NFR BLD: 66 % (ref 32–75)
NITRITE UR QL STRIP.AUTO: NEGATIVE
NRBC # BLD: 0 K/UL (ref 0–0.01)
NRBC BLD-RTO: 0 PER 100 WBC
PH UR STRIP: 8 [PH] (ref 5–8)
PLATELET # BLD AUTO: 338 K/UL (ref 150–400)
PMV BLD AUTO: 9.6 FL (ref 8.9–12.9)
POTASSIUM SERPL-SCNC: 4 MMOL/L (ref 3.5–5.1)
PROT SERPL-MCNC: 7.7 G/DL (ref 6.4–8.2)
PROT UR STRIP-MCNC: NEGATIVE MG/DL
RBC # BLD AUTO: 5.12 M/UL (ref 3.8–5.2)
RBC #/AREA URNS HPF: ABNORMAL /HPF (ref 0–5)
SAMPLES BEING HELD,HOLD: NORMAL
SODIUM SERPL-SCNC: 140 MMOL/L (ref 136–145)
SP GR UR REFRACTOMETRY: 1.02 (ref 1–1.03)
UR CULT HOLD, URHOLD: NORMAL
UROBILINOGEN UR QL STRIP.AUTO: 0.2 EU/DL (ref 0.2–1)
WBC # BLD AUTO: 6.4 K/UL (ref 3.6–11)
WBC URNS QL MICRO: ABNORMAL /HPF (ref 0–4)

## 2020-03-07 PROCEDURE — 36415 COLL VENOUS BLD VENIPUNCTURE: CPT

## 2020-03-07 PROCEDURE — 74011250636 HC RX REV CODE- 250/636: Performed by: NURSE PRACTITIONER

## 2020-03-07 PROCEDURE — 74011250637 HC RX REV CODE- 250/637: Performed by: NURSE PRACTITIONER

## 2020-03-07 PROCEDURE — 96374 THER/PROPH/DIAG INJ IV PUSH: CPT

## 2020-03-07 PROCEDURE — 81025 URINE PREGNANCY TEST: CPT

## 2020-03-07 PROCEDURE — 96375 TX/PRO/DX INJ NEW DRUG ADDON: CPT

## 2020-03-07 PROCEDURE — 80053 COMPREHEN METABOLIC PANEL: CPT

## 2020-03-07 PROCEDURE — 96361 HYDRATE IV INFUSION ADD-ON: CPT

## 2020-03-07 PROCEDURE — 85025 COMPLETE CBC W/AUTO DIFF WBC: CPT

## 2020-03-07 PROCEDURE — 81001 URINALYSIS AUTO W/SCOPE: CPT

## 2020-03-07 PROCEDURE — 99284 EMERGENCY DEPT VISIT MOD MDM: CPT

## 2020-03-07 RX ORDER — DEXAMETHASONE SODIUM PHOSPHATE 10 MG/ML
10 INJECTION INTRAMUSCULAR; INTRAVENOUS
Status: COMPLETED | OUTPATIENT
Start: 2020-03-07 | End: 2020-03-07

## 2020-03-07 RX ORDER — DIPHENHYDRAMINE HYDROCHLORIDE 50 MG/ML
50 INJECTION, SOLUTION INTRAMUSCULAR; INTRAVENOUS
Status: COMPLETED | OUTPATIENT
Start: 2020-03-07 | End: 2020-03-07

## 2020-03-07 RX ORDER — BUTALBITAL, ACETAMINOPHEN AND CAFFEINE 50; 325; 40 MG/1; MG/1; MG/1
1 TABLET ORAL
Status: COMPLETED | OUTPATIENT
Start: 2020-03-07 | End: 2020-03-07

## 2020-03-07 RX ORDER — ONDANSETRON 2 MG/ML
4 INJECTION INTRAMUSCULAR; INTRAVENOUS
Status: COMPLETED | OUTPATIENT
Start: 2020-03-07 | End: 2020-03-07

## 2020-03-07 RX ORDER — KETOROLAC TROMETHAMINE 30 MG/ML
30 INJECTION, SOLUTION INTRAMUSCULAR; INTRAVENOUS ONCE
Status: COMPLETED | OUTPATIENT
Start: 2020-03-07 | End: 2020-03-07

## 2020-03-07 RX ADMIN — SODIUM CHLORIDE 1000 ML: 900 INJECTION, SOLUTION INTRAVENOUS at 11:34

## 2020-03-07 RX ADMIN — BUTALBITAL, ACETAMINOPHEN, AND CAFFEINE 1 TABLET: 50; 325; 40 TABLET ORAL at 11:34

## 2020-03-07 RX ADMIN — KETOROLAC TROMETHAMINE 30 MG: 30 INJECTION, SOLUTION INTRAMUSCULAR at 10:26

## 2020-03-07 RX ADMIN — SODIUM CHLORIDE 1000 ML: 900 INJECTION, SOLUTION INTRAVENOUS at 10:26

## 2020-03-07 RX ADMIN — DEXAMETHASONE SODIUM PHOSPHATE 10 MG: 10 INJECTION, SOLUTION INTRAMUSCULAR; INTRAVENOUS at 10:27

## 2020-03-07 RX ADMIN — DIPHENHYDRAMINE HYDROCHLORIDE 50 MG: 50 INJECTION, SOLUTION INTRAMUSCULAR; INTRAVENOUS at 11:32

## 2020-03-07 RX ADMIN — ONDANSETRON 4 MG: 2 INJECTION INTRAMUSCULAR; INTRAVENOUS at 10:27

## 2020-03-07 NOTE — DISCHARGE INSTRUCTIONS
Patient Education        Migraine Headache: Care Instructions  Your Care Instructions  Migraines are painful, throbbing headaches that often start on one side of the head. They may cause nausea and vomiting and make you sensitive to light, sound, or smell. Without treatment, migraines can last from 4 hours to a few days. Medicines can help prevent migraines or stop them after they have started. Your doctor can help you find which ones work best for you. Follow-up care is a key part of your treatment and safety. Be sure to make and go to all appointments, and call your doctor if you are having problems. It's also a good idea to know your test results and keep a list of the medicines you take. How can you care for yourself at home? · Do not drive if you have taken a prescription pain medicine. · Rest in a quiet, dark room until your headache is gone. Close your eyes, and try to relax or go to sleep. Don't watch TV or read. · Put a cold, moist cloth or cold pack on the painful area for 10 to 20 minutes at a time. Put a thin cloth between the cold pack and your skin. · Use a warm, moist towel or a heating pad set on low to relax tight shoulder and neck muscles. · Have someone gently massage your neck and shoulders. · Take your medicines exactly as prescribed. Call your doctor if you think you are having a problem with your medicine. You will get more details on the specific medicines your doctor prescribes. · Be careful not to take pain medicine more often than the instructions allow. You could get worse or more frequent headaches when the medicine wears off. To prevent migraines  · Keep a headache diary so you can figure out what triggers your headaches. Avoiding triggers may help you prevent headaches. Record when each headache began, how long it lasted, and what the pain was like.  (Was it throbbing, aching, stabbing, or dull?) Write down any other symptoms you had with the headache, such as nausea, flashing lights or dark spots, or sensitivity to bright light or loud noise. Note if the headache occurred near your period. List anything that might have triggered the headache. Triggers may include certain foods (chocolate, cheese, wine) or odors, smoke, bright light, stress, or lack of sleep. · If your doctor has prescribed medicine for your migraines, take it as directed. You may have medicine that you take only when you get a migraine and medicine that you take all the time to help prevent migraines. ? If your doctor has prescribed medicine for when you get a headache, take it at the first sign of a migraine, unless your doctor has given you other instructions. ? If your doctor has prescribed medicine to prevent migraines, take it exactly as prescribed. Call your doctor if you think you are having a problem with your medicine. · Find healthy ways to deal with stress. Migraines are most common during or right after stressful times. Take time to relax before and after you do something that has caused a migraine in the past.  · Try to keep your muscles relaxed by keeping good posture. Check your jaw, face, neck, and shoulder muscles for tension. Try to relax them. When you sit at a desk, change positions often. And make sure to stretch for 30 seconds each hour. · Get plenty of sleep and exercise. · Eat meals on a regular schedule. Avoid foods and drinks that often trigger migraines. These include chocolate, alcohol (especially red wine and port), aspartame, monosodium glutamate (MSG), and some additives found in foods (such as hot dogs, mercado, cold cuts, aged cheeses, and pickled foods). · Limit caffeine. Don't drink too much coffee, tea, or soda. But don't quit caffeine suddenly. That can also give you migraines. · Do not smoke or allow others to smoke around you. If you need help quitting, talk to your doctor about stop-smoking programs and medicines.  These can increase your chances of quitting for good.  · If you are taking birth control pills or hormone therapy, talk to your doctor about whether they are triggering your migraines. When should you call for help? Call 911 anytime you think you may need emergency care. For example, call if:    · You have signs of a stroke. These may include:  ? Sudden numbness, paralysis, or weakness in your face, arm, or leg, especially on only one side of your body. ? Sudden vision changes. ? Sudden trouble speaking. ? Sudden confusion or trouble understanding simple statements. ? Sudden problems with walking or balance. ? A sudden, severe headache that is different from past headaches.    Call your doctor now or seek immediate medical care if:    · You have new or worse nausea and vomiting.     · You have a new or higher fever.     · Your headache gets much worse.    Watch closely for changes in your health, and be sure to contact your doctor if:    · You are not getting better after 2 days (48 hours). Where can you learn more? Go to http://marie-nuno.info/. Enter S133 in the search box to learn more about \"Migraine Headache: Care Instructions. \"  Current as of: March 28, 2019  Content Version: 12.2  © 7670-3744 [employer]. Care instructions adapted under license by [employer] (which disclaims liability or warranty for this information). If you have questions about a medical condition or this instruction, always ask your healthcare professional. [address] any warranty or liability for your use of this information.

## 2020-03-07 NOTE — ED PROVIDER NOTES
This is a 62-year-old female who presents ambulatory to the emergency room with complaints of a migraine for approximately 1 week. Patient states her migraine started last Saturday and has just persisted. Patient sees Dr. Uriel Stevenson for chronic migraines, took her usual medications with minimal relief. Dr. Uriel Stevenson sent her here for the migraine cocktail. Patient states she has had imaging in the past.  Denies chest pain, shortness of breath, dizziness. Positive photophobia. No visual changes. No fever or chills, positive nausea no vomiting. No neurological deficits. There are no further complaints at this time. Jaqueline Espinoza MD  Past Medical History:  No date:  Anxiety  No date: Fatigue  No date: Headache  No date: Nausea  No date: Ringing in ears  No date: Thyroid disease  No date: Vertigo  No date: Visual disturbance\  Past Surgical History:  No date: HX HEENT      Comment:  right thyroid removal             Past Medical History:   Diagnosis Date    Anxiety     Fatigue     Headache     Nausea     Ringing in ears     Thyroid disease     Vertigo     Visual disturbance        Past Surgical History:   Procedure Laterality Date    HX HEENT      right thyroid removal         Family History:   Problem Relation Age of Onset    Headache Father     Heart Disease Father     Neuropathy Father     Stroke Father     Diabetes Father     Dementia Maternal Grandmother     Cancer Paternal Grandfather        Social History     Socioeconomic History    Marital status:      Spouse name: Not on file    Number of children: Not on file    Years of education: Not on file    Highest education level: Not on file   Occupational History    Not on file   Social Needs    Financial resource strain: Not on file    Food insecurity:     Worry: Not on file     Inability: Not on file    Transportation needs:     Medical: Not on file     Non-medical: Not on file   Tobacco Use    Smoking status: Never Smoker    Smokeless tobacco: Never Used   Substance and Sexual Activity    Alcohol use: No    Drug use: Not on file    Sexual activity: Not on file   Lifestyle    Physical activity:     Days per week: Not on file     Minutes per session: Not on file    Stress: Not on file   Relationships    Social connections:     Talks on phone: Not on file     Gets together: Not on file     Attends [employer]ism service: Not on file     Active member of club or organization: Not on file     Attends meetings of clubs or organizations: Not on file     Relationship status: Not on file    Intimate partner violence:     Fear of current or ex partner: Not on file     Emotionally abused: Not on file     Physically abused: Not on file     Forced sexual activity: Not on file   Other Topics Concern    Not on file   Social History Narrative    Not on file         ALLERGIES: Patient has no known allergies. Review of Systems   Constitutional: Negative for appetite change, chills, diaphoresis, fatigue and fever. HENT: Negative for congestion, ear discharge, ear pain, sinus pressure, sinus pain, sore throat and trouble swallowing. Eyes: Positive for photophobia. Negative for pain, redness and visual disturbance. Respiratory: Negative for chest tightness, shortness of breath and wheezing. Cardiovascular: Negative for chest pain and palpitations. Gastrointestinal: Positive for nausea. Negative for abdominal distention, abdominal pain and vomiting. Endocrine: Negative. Genitourinary: Negative for difficulty urinating, flank pain, frequency and urgency. Musculoskeletal: Negative for back pain, neck pain and neck stiffness. Skin: Negative for color change, pallor, rash and wound. Allergic/Immunologic: Negative. Neurological: Positive for headaches. Negative for dizziness, speech difficulty and weakness. Hematological: Does not bruise/bleed easily. Psychiatric/Behavioral: Negative for behavioral problems.  The patient is not nervous/anxious. Vitals:    03/07/20 0950   BP: 127/74   Pulse: 72   Resp: 18   Temp: 98.1 °F (36.7 °C)   SpO2: 100%   Weight: 86.2 kg (190 lb)   Height: 5' 7\" (1.702 m)            Physical Exam  Vitals signs and nursing note reviewed. Exam conducted with a chaperone present. Constitutional:       General: She is not in acute distress. Appearance: Normal appearance. She is well-developed. HENT:      Head: Normocephalic and atraumatic. Right Ear: External ear normal.      Left Ear: External ear normal.      Nose: Nose normal.      Mouth/Throat:      Mouth: Mucous membranes are moist.   Eyes:      General:         Right eye: No discharge. Left eye: No discharge. Conjunctiva/sclera: Conjunctivae normal.      Pupils: Pupils are equal, round, and reactive to light. Neck:      Musculoskeletal: Normal range of motion and neck supple. Vascular: No JVD. Trachea: No tracheal deviation. Cardiovascular:      Rate and Rhythm: Normal rate and regular rhythm. Pulses: Normal pulses. Heart sounds: Normal heart sounds. No murmur. No gallop. Pulmonary:      Effort: Pulmonary effort is normal. No respiratory distress. Breath sounds: Normal breath sounds. No wheezing or rales. Chest:      Chest wall: No tenderness. Abdominal:      General: Bowel sounds are normal. There is no distension. Palpations: Abdomen is soft. Tenderness: There is no abdominal tenderness. There is no guarding or rebound. Genitourinary:     Comments: Negative    Musculoskeletal: Normal range of motion. General: No tenderness. Skin:     General: Skin is warm and dry. Capillary Refill: Capillary refill takes less than 2 seconds. Coloration: Skin is not pale. Findings: No erythema or rash. Neurological:      Mental Status: She is alert and oriented to person, place, and time. Motor: No weakness.       Coordination: Coordination normal.   Psychiatric: Mood and Affect: Mood normal.         Behavior: Behavior normal.         Thought Content: Thought content normal.         Judgment: Judgment normal.          MDM  Number of Diagnoses or Management Options  Other migraine without status migrainosus, not intractable: new and does not require workup  Diagnosis management comments: Differential diagnosis includes migraine, atypical headache. Discharged home and follow-up with PCP. Follow-up with Dr. Muriel Boyd as an outpatient. Return to the emergency room with worsening symptoms including neurological deficits. Patient states her symptoms have improved since receiving the migraine cocktail. In agreement with discharge home and follow-up as stated. Procedures    11:22 AM  Re-assessed, and pt is still complaining of 8/10 headache. Will give benadryl, 1 more liter of fluid, and Fioricet. 12:23 PM  Pt has been reexamined. States headache basically resolved. Pt has no new complaints, changes or physical findings. Care plan outlined and precautions discussed. All available results were reviewed with pt. All medications were reviewed with pt. All of pt's questions and concerns were addressed. Pt agrees to F/U as instructed and agrees to return to ED upon further deterioration. Pt is ready to go home.   Lenka Lloyd NP

## 2020-03-07 NOTE — ED NOTES
Bedside and Verbal shift change report given to [address], RN and Makeda Leiva RN (oncoming nurse) by Yves Velez RN (offgoing nurse). Report included the following information ED Summary.

## 2020-03-07 NOTE — ED TRIAGE NOTES
Pt c/o migraine x 1 week. Is pt of Dr. Summer Salazar. Has taken usual meds without relief. Dr. Summer Salazar sent her here for migraine cocktail.

## 2020-03-07 NOTE — ED NOTES
Patient received discharge instruction and verbalized understanding. Patient has no questions at this time. Patient left ED ambulatory in stable condition.

## 2020-03-23 DIAGNOSIS — G43.119 MIGRAINE WITH AURA, INTRACTABLE, WITHOUT STATUS MIGRAINOSUS: ICD-10-CM

## 2020-03-24 RX ORDER — SUMATRIPTAN AND NAPROXEN SODIUM 85; 500 MG/1; MG/1
TABLET, FILM COATED ORAL
Qty: 9 TAB | Refills: 5 | Status: SHIPPED | OUTPATIENT
Start: 2020-03-24 | End: 2020-12-07

## 2020-03-24 RX ORDER — ERENUMAB-AOOE 140 MG/ML
140 INJECTION, SOLUTION SUBCUTANEOUS
Qty: 1 EACH | Refills: 5 | Status: SHIPPED | OUTPATIENT
Start: 2020-03-24 | End: 2020-04-08

## 2020-04-08 DIAGNOSIS — G43.119 MIGRAINE WITH AURA, INTRACTABLE, WITHOUT STATUS MIGRAINOSUS: ICD-10-CM

## 2020-04-08 RX ORDER — ERENUMAB-AOOE 140 MG/ML
INJECTION, SOLUTION SUBCUTANEOUS
Qty: 1 ML | Refills: 5 | Status: SHIPPED | OUTPATIENT
Start: 2020-04-08 | End: 2020-05-03

## 2020-05-03 DIAGNOSIS — G43.119 MIGRAINE WITH AURA, INTRACTABLE, WITHOUT STATUS MIGRAINOSUS: ICD-10-CM

## 2020-05-03 RX ORDER — ERENUMAB-AOOE 140 MG/ML
INJECTION, SOLUTION SUBCUTANEOUS
Qty: 1 ML | Refills: 5 | Status: SHIPPED | OUTPATIENT
Start: 2020-05-03 | End: 2020-06-18 | Stop reason: ALTCHOICE

## 2020-06-17 NOTE — PROGRESS NOTES
Migraine f/u  Has increased migraines. appr 12-15 over the last 30 days. Last dose aimovig 140 mg was this past week. She feels this was working well for a while but seems to be not be as effective in migraine prevention. [facility] Treximet is out of stock and does not know when new shipment will come in. Pt would like an alternative sent to [email or web address].     Number to text is [phone]

## 2020-06-18 ENCOUNTER — VIRTUAL VISIT (OUTPATIENT)
Dept: NEUROLOGY | Age: 45
End: 2020-06-18

## 2020-06-18 DIAGNOSIS — G43.011 INTRACTABLE MIGRAINE WITHOUT AURA AND WITH STATUS MIGRAINOSUS: Primary | ICD-10-CM

## 2020-06-18 RX ORDER — FREMANEZUMAB-VFRM 225 MG/1.5ML
225 INJECTION SUBCUTANEOUS
Qty: 1 DEVICE | Refills: 5 | Status: SHIPPED | OUTPATIENT
Start: 2020-06-18 | End: 2020-11-16 | Stop reason: SDUPTHER

## 2020-06-18 RX ORDER — RIMEGEPANT SULFATE 75 MG/75MG
75 TABLET, ORALLY DISINTEGRATING ORAL
Qty: 8 TAB | Refills: 5 | Status: SHIPPED | OUTPATIENT
Start: 2020-06-18 | End: 2020-06-18

## 2020-06-18 NOTE — PROGRESS NOTES
[facility] NEUROLOGY [address]   [address]   [address]    [facility]    Fax        Brandon Rosado is a 45 y.o. female who was seen by synchronous (real-time) audio-video technology on 6/18/2020. Consent:  She and/or her healthcare decision maker is aware that this patient-initiated Telehealth encounter is a billable service, with coverage as determined by her insurance carrier. She is aware that she may receive a bill and has provided verbal consent to proceed: Yes    I was in the office while conducting this encounter. Subjective:   Brandon Rosado was seen for No chief complaint on file. 62-year-old woman who is seen today for follow-up intractable migraine headaches. Her last visit was November. At that time we increased her Aimovig to the 140 mg dose. She was continued on Treximet for abortive therapy. Today she reports her headaches are not doing well. She has had a significant increase despite Aimovig at an increased dose. In March she had non-headaches, April 10, May 12 and thus far in June she has had 6. She had to go to the emergency department on 7 March for unremitting headache. She had a headache lasting multiple days. No focal deficit. No loss of conscious. No loss of vision. No chest pain palpitations. She has not been ill. No fever. No chills. Additionally, Treximet apparently is on back order or the pharmacy is out so she only has 1 tablet left. Prior to Admission medications    Medication Sig Start Date End Date Taking?  Authorizing Provider   Aimovig Autoinjector 140 mg/mL injection INJECT 1 MILLILITER (140 MILLIGRAMS) SUBCUTANEOUSLY EVERY 30 DAYS 5/3/20   Nelli Hidalgo MD   SUMAtriptan-naproxen (Treximet)  mg tablet TAKE 1 TABLET BY MOUTH AT ONSET OF MIGRAINE, MAY REPEAT IN 2 HOURS IF NEEDED; DO NOT EXCEED 2 TABLETS/24HRS 3/24/20   Nelli Hidalgo MD   levonorgestrel (MIRENA) 20 mcg/24 hr (5 years) IUD 1 Device by IntraUTERine route once. 2/19/17   Provider, Historical   ARMOUR THYROID 30 mg tablet Take 105 mcg by mouth daily. 7/11/17   Provider, Historical   multivitamin (ONE A DAY) tablet Take 1 Tab by mouth daily. Provider, Historical   CHOLECALCIFEROL, VITAMIN D3, (VITAMIN D3 PO) Take  by mouth. Provider, Historical   FERROUS FUMARATE/VIT BCOMP,C (SUPER B COMPLEX PO) Take  by mouth. Provider, Historical   FERROUS SULFATE (IRON PO) Take  by mouth. Provider, Historical     No Known Allergies    ROS  Pertinent positives and negatives as noted with remainder of comprehensive review negative    Examination  She is a very pleasant lady. She is awake alert oriented and conversant. Normal speech and language. Normal cognitive function. No icterus. No nystagmus. Full versions. Face symmetrical.  Moves all extremities well. Ambulates steadily. No ataxia. Due to this being a TeleHealth evaluation, many elements of the physical examination are unable to be assessed. Impression/Plan  Intractable migraine headaches worse despite preventative medication and with ineffective abortive therapy as well as lack of availability of such abortive therapy. We discussed options today. We will change from [address] [place] [address] over to [address] [place] [address] and we discussed the administration which is injectable as well as potential side effects, expectations, potential benefits and alternatives. Discussed the mechanism of action which is different from [address] [place] [address] and the expectation that she should hopefully respond to ligand blockade versus receptor blockade with the Aimovig. For abortive therapy Nurtec 75 mg 1 at onset maximum 1 tablet in 24 hours and we again discussed studies expectations side effects etc.  Continue to track headaches.   Follow in 3 months    Pursuant to the emergency declaration under the [address] [address] and Response Supplemental Appropriations Act, this Virtual  Visit was conducted, with patient's consent, to reduce the patient's risk of exposure to COVID-19 and provide continuity of care for an established patient. Services were provided through a video synchronous discussion virtually to substitute for in-person clinic visit. Alton Mondragon MD     This document was created with the assistance of voice recognition software and therefore unintended syntax errors may be present despite editing. For any questions please contact me directly. This note will not be viewable in [address]

## 2020-06-29 ENCOUNTER — PATIENT MESSAGE (OUTPATIENT)
Dept: NEUROLOGY | Age: 45
End: 2020-06-29

## 2020-06-29 DIAGNOSIS — G43.011 INTRACTABLE MIGRAINE WITHOUT AURA AND WITH STATUS MIGRAINOSUS: Primary | ICD-10-CM

## 2020-06-30 RX ORDER — RIMEGEPANT SULFATE 75 MG/75MG
75 TABLET, ORALLY DISINTEGRATING ORAL
Qty: 8 TAB | Refills: 3 | Status: SHIPPED | OUTPATIENT
Start: 2020-06-30 | End: 2020-06-30

## 2020-06-30 RX ORDER — METHYLPREDNISOLONE 4 MG/1
TABLET ORAL
Qty: 1 DOSE PACK | Refills: 0 | Status: SHIPPED | OUTPATIENT
Start: 2020-06-30 | End: 2021-01-28 | Stop reason: ALTCHOICE

## 2020-06-30 NOTE — TELEPHONE ENCOUNTER
From: Katherine Eubanks  To: Virgilio Bradley MD  Sent: 6/29/2020 10:39 AM EDT  Subject: Non-Urgent Medical Question    Good Morning,    I have been struggling with migraines since Wednesday, June 24. I ended up going to the ER Saturday evening and received an IV with a \"migraine cocktail\". I felt better yesterday, but woke up again this morning with a migraine coming on. I have taken medicine which has dulled but not gotten rid of the headache. I am just concerned because this is day 6. I am experiencing severe neck pain (which is common with my migraines) and some shoulder pain. Is there anything else we can do? Thank you,  ABDULLAHI Terrazas

## 2020-09-18 ENCOUNTER — OFFICE VISIT (OUTPATIENT)
Dept: NEUROLOGY | Age: 45
End: 2020-09-18
Payer: COMMERCIAL

## 2020-09-18 VITALS
DIASTOLIC BLOOD PRESSURE: 68 MMHG | TEMPERATURE: 96 F | BODY MASS INDEX: 32.11 KG/M2 | WEIGHT: 205 LBS | SYSTOLIC BLOOD PRESSURE: 110 MMHG

## 2020-09-18 DIAGNOSIS — G43.919 INTRACTABLE MIGRAINE WITHOUT STATUS MIGRAINOSUS, UNSPECIFIED MIGRAINE TYPE: Primary | ICD-10-CM

## 2020-09-18 PROCEDURE — 99214 OFFICE O/P EST MOD 30 MIN: CPT | Performed by: NURSE PRACTITIONER

## 2020-09-18 RX ORDER — KETOROLAC TROMETHAMINE 30 MG/ML
INJECTION, SOLUTION INTRAMUSCULAR; INTRAVENOUS
Qty: 4 VIAL | Refills: 5 | Status: SHIPPED | OUTPATIENT
Start: 2020-09-18 | End: 2021-10-29

## 2020-09-18 RX ORDER — RIMEGEPANT SULFATE 75 MG/75MG
TABLET, ORALLY DISINTEGRATING ORAL
COMMUNITY
Start: 2020-09-16 | End: 2020-09-18

## 2020-09-18 NOTE — PROGRESS NOTES
Pt has already had 7 migraines this month and had 9 migraines in August. Pt had 14 in July and started Ajovy on July 15th. Pt thinks there is some difference.

## 2020-09-21 ENCOUNTER — TELEPHONE (OUTPATIENT)
Dept: NEUROLOGY | Age: 45
End: 2020-09-21

## 2020-09-22 NOTE — PROGRESS NOTES
Simone Siegel is a 39 y.o. female who presents with the following  Chief Complaint   Patient presents with    Migraine    Follow-up       HPI     FU for chronic migraine. 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 Stress is high   Migraines are slowly getting better after 3 Ajovy down to about 16 a month. Had to go to the ER 2 weeks ago due to a terrible, intractable migraine. Tried everything she had and nothing worked. She states it was really bad. Treximet sometimes help. Nurtec did not. Never used Ubrelvy, Toradol IM  HA are located unilateral, intense, pulsating. This one escalated quick and she had light, sound sensitive. Nausea, dizziness. Nothing else has changed. Still not seeing that great of progress. Failed AImovig before this. And AED, SSRI BP     No Known Allergies    Current Outpatient Medications   Medication Sig    ubrogepant (UBRELVY) 100 mg tablet 1 at HA onset and repeat in 2 hours if needed. Max 2 in 24 hours BIN: [id number] PCN: [id number] GRP; [id number] ID: [id number] please use this card    ketorolac (TORADOL) 30 mg/mL (1 mL) injection Inject 1 ML IM at HA onset as needed every 8 hours up to 3 days a week    Syringe with Needle, Safety 1 mL 27 gauge x 1/2\" syrg Use 1 syringe as needed for IM injection    fremanezumab-vfrm (Ajovy Autoinjector) 225 mg/1.5 mL atIn 225 mg by SubCUTAneous route every thirty (30) days.  SUMAtriptan-naproxen (Treximet)  mg tablet TAKE 1 TABLET BY MOUTH AT ONSET OF MIGRAINE, MAY REPEAT IN 2 HOURS IF NEEDED; DO NOT EXCEED 2 TABLETS/24HRS    levonorgestrel (MIRENA) 20 mcg/24 hr (5 years) IUD 1 Device by IntraUTERine route once.  ARMOUR THYROID 30 mg tablet Take 105 mcg by mouth daily.  multivitamin (ONE A DAY) tablet Take 1 Tab by mouth daily.  CHOLECALCIFEROL, VITAMIN D3, (VITAMIN D3 PO) Take  by mouth.  FERROUS FUMARATE/VIT BCOMP,C (SUPER B COMPLEX PO) Take  by mouth.     FERROUS SULFATE (IRON PO) Take  by mouth.    methylPREDNISolone (MEDROL DOSEPACK) 4 mg tablet Take as directed     No current facility-administered medications for this visit. Social History     Tobacco Use   Smoking Status Never Smoker   Smokeless Tobacco Never Used       Past Medical History:   Diagnosis Date    Anxiety     Fatigue     Headache     Nausea     Ringing in ears     Thyroid disease     Vertigo     Visual disturbance        Past Surgical History:   Procedure Laterality Date    HX HEENT      right thyroid removal       Family History   Problem Relation Age of Onset    Headache Father     Heart Disease Father     Neuropathy Father     Stroke Father     Diabetes Father     Dementia Maternal Grandmother     Cancer Paternal Grandfather        Social History     Socioeconomic History    Marital status:      Spouse name: Not on file    Number of children: Not on file    Years of education: Not on file    Highest education level: Not on file   Tobacco Use    Smoking status: Never Smoker    Smokeless tobacco: Never Used   Substance and Sexual Activity    Alcohol use: No       Review of Systems   Eyes: Positive for blurred vision and photophobia. Negative for double vision. Gastrointestinal: Positive for nausea. Negative for vomiting. Neurological: Positive for dizziness and headaches. Negative for tingling, tremors and sensory change. Remainder of comprehensive review is negative. Physical Exam :    Visit Vitals  /68   Temp (!) 96 °F (35.6 °C)   Wt 93 kg (205 lb)   BMI 32.11 kg/m²       General: Well defined, nourished, and groomed individual in no acute distress.    Neck: Supple, nontender, no bruits, no pain with resistance to active range of motion.    Heart: Regular rate and rhythm, no murmurs, rub, or gallop. Normal S1S2.   Lungs: Clear to auscultation bilaterally with equal chest expansion, no cough, no wheeze  Musculoskeletal: Extremities revealed no edema and had full range of motion of joints.    Psych: Good mood and bright affect    NEUROLOGICAL EXAMINATION:    Mental Status: Alert and oriented to person, place, and time    Cranial Nerves:    II, III, IV, VI: Visual acuity grossly intact. Visual fields are normal.    Pupils are equal, round, and reactive to light and accommodation.    Extra-ocular movements are full and fluid. Fundoscopic exam was benign, no ptosis or nystagmus.    V-XII: Hearing is grossly intact. Facial features are symmetric, with normal sensation and strength. The palate rises symmetrically and the tongue protrudes midline. Sternocleidomastoids 5/5. Motor Examination: Normal tone, bulk, and strength, 5/5 muscle strength throughout. Coordination: Finger to nose was normal. No resting or intention tremor    Gait and Station: Steady while walking. Normal arm swing. No pronator drift. No muscle wasting or fasiculations noted. Reflexes: DTRs 2+ throughout. Results for orders placed or performed during the hospital encounter of 03/07/20   URINE CULTURE HOLD SAMPLE    Specimen: Serum   Result Value Ref Range    Urine culture hold        Urine on hold in Microbiology dept for 2 days. If unpreserved urine is submitted, it cannot be used for addtional testing after 24 hours, recollection will be required. CBC WITH AUTOMATED DIFF   Result Value Ref Range    WBC 6.4 3.6 - 11.0 K/uL    RBC 5.12 3.80 - 5.20 M/uL    HGB 15.1 11.5 - 16.0 g/dL    HCT 46.4 35.0 - 47.0 %    MCV 90.6 80.0 - 99.0 FL    MCH 29.5 26.0 - 34.0 PG    MCHC 32.5 30.0 - 36.5 g/dL    RDW 12.9 11.5 - 14.5 %    PLATELET 791 359 - 896 K/uL    MPV 9.6 8.9 - 12.9 FL    NRBC 0.0 0  WBC    ABSOLUTE NRBC 0.00 0.00 - 0.01 K/uL    NEUTROPHILS 66 32 - 75 %    LYMPHOCYTES 23 12 - 49 %    MONOCYTES 8 5 - 13 %    EOSINOPHILS 2 0 - 7 %    BASOPHILS 1 0 - 1 %    IMMATURE GRANULOCYTES 0 0.0 - 0.5 %    ABS. NEUTROPHILS 4.2 1.8 - 8.0 K/UL    ABS. LYMPHOCYTES 1.5 0.8 - 3.5 K/UL    ABS.  MONOCYTES 0.5 0.0 - 1.0 K/UL    ABS. EOSINOPHILS 0.1 0.0 - 0.4 K/UL    ABS. BASOPHILS 0.1 0.0 - 0.1 K/UL    ABS. IMM. GRANS. 0.0 0.00 - 0.04 K/UL    DF AUTOMATED     METABOLIC PANEL, COMPREHENSIVE   Result Value Ref Range    Sodium 140 136 - 145 mmol/L    Potassium 4.0 3.5 - 5.1 mmol/L    Chloride 105 97 - 108 mmol/L    CO2 29 21 - 32 mmol/L    Anion gap 6 5 - 15 mmol/L    Glucose 84 65 - 100 mg/dL    BUN 16 6 - 20 MG/DL    Creatinine 0.76 0.55 - 1.02 MG/DL    BUN/Creatinine ratio 21 (H) 12 - 20      GFR est AA >60 >60 ml/min/1.73m2    GFR est non-AA >60 >60 ml/min/1.73m2    Calcium 9.3 8.5 - 10.1 MG/DL    Bilirubin, total 0.6 0.2 - 1.0 MG/DL    ALT (SGPT) 27 12 - 78 U/L    AST (SGOT) 13 (L) 15 - 37 U/L    Alk. phosphatase 82 45 - 117 U/L    Protein, total 7.7 6.4 - 8.2 g/dL    Albumin 4.4 3.5 - 5.0 g/dL    Globulin 3.3 2.0 - 4.0 g/dL    A-G Ratio 1.3 1.1 - 2.2     URINALYSIS W/MICROSCOPIC   Result Value Ref Range    Color YELLOW/STRAW      Appearance TURBID (A) CLEAR      Specific gravity 1.017 1.003 - 1.030      pH (UA) 8.0 5.0 - 8.0      Protein NEGATIVE  NEG mg/dL    Glucose NEGATIVE  NEG mg/dL    Ketone NEGATIVE  NEG mg/dL    Bilirubin NEGATIVE  NEG      Blood NEGATIVE  NEG      Urobilinogen 0.2 0.2 - 1.0 EU/dL    Nitrites NEGATIVE  NEG      Leukocyte Esterase NEGATIVE  NEG      WBC 0-4 0 - 4 /hpf    RBC 0-5 0 - 5 /hpf    Epithelial cells FEW FEW /lpf    Bacteria 1+ (A) NEG /hpf    Amorphous Crystals 1+ (A) NEG   SAMPLES BEING HELD   Result Value Ref Range    SAMPLES BEING HELD 1SST     COMMENT        Add-on orders for these samples will be processed based on acceptable specimen integrity and analyte stability, which may vary by analyte. HCG URINE, QL. - POC   Result Value Ref Range    Pregnancy test,urine (POC) NEGATIVE  NEG         Orders Placed This Encounter    DISCONTD: Nurtec ODT 75 mg disintegrating tablet     Sig: dissolve 1 tablet by mouth ON THE TONGUE ONCE AS NEEDED  [address] .  (REFER TO PRESCRIPTION NOTES).  ubrogepant (UBRELVY) 100 mg tablet     Si at HA onset and repeat in 2 hours if needed. Max 2 in 24 hours BIN: [id number] PCN: [id number] GRP; [id number] ID: [id number] please use this card     Dispense:  10 Tab     Refill:  5    ketorolac (TORADOL) 30 mg/mL (1 mL) injection     Sig: Inject 1 ML IM at HA onset as needed every 8 hours up to 3 days a week     Dispense:  4 Vial     Refill:  5    Syringe with Needle, Safety 1 mL 27 gauge x 1/2\" syrg     Sig: Use 1 syringe as needed for IM injection     Dispense:  25 Pen Needle     Refill:  5       No diagnosis found. discussed chronic migraine. If ajovy failure we will likely transition to Botox for chronic migraine treatment. Try Jeanell Fuel for [facility] rescue [facility]. Also send IM toradol to keep her out of the ER as she had a go a few weeks ago. Continue to stay on track of ha and 1-2 more Ajovy until we decide. Call if things get worse.              This note will not be viewable in [employer]

## 2020-09-23 ENCOUNTER — TELEPHONE (OUTPATIENT)
Dept: NEUROLOGY | Facility: CLINIC | Age: 45
End: 2020-09-23

## 2020-09-23 ENCOUNTER — TELEPHONE (OUTPATIENT)
Dept: NEUROLOGY | Age: 45
End: 2020-09-23

## 2020-09-23 NOTE — TELEPHONE ENCOUNTER
Re: Ketorolac approved    Approval rec'd from [address] via [facility]    Effective 09/23/20-10/23/20  PA- [id number]    Approval fax sent to [employer]

## 2020-09-23 NOTE — TELEPHONE ENCOUNTER
Re: Liang Melendez approved    Approval rec'd from [facility] via  [place]    Effective 09/23/20-09/23/21  PA- [id number]    Approval fax sent to [facility]

## 2020-11-16 RX ORDER — FREMANEZUMAB-VFRM 225 MG/1.5ML
225 INJECTION SUBCUTANEOUS
Qty: 1 DEVICE | Refills: 5 | Status: SHIPPED | OUTPATIENT
Start: 2020-11-16 | End: 2021-01-28

## 2020-12-04 DIAGNOSIS — G43.119 MIGRAINE WITH AURA, INTRACTABLE, WITHOUT STATUS MIGRAINOSUS: ICD-10-CM

## 2020-12-07 RX ORDER — SUMATRIPTAN AND NAPROXEN SODIUM 85; 500 MG/1; MG/1
TABLET, FILM COATED ORAL
Qty: 9 TAB | Refills: 4 | Status: SHIPPED | OUTPATIENT
Start: 2020-12-07 | End: 2021-06-25 | Stop reason: SDUPTHER

## 2021-01-28 ENCOUNTER — OFFICE VISIT (OUTPATIENT)
Dept: NEUROLOGY | Age: 46
End: 2021-01-28
Payer: COMMERCIAL

## 2021-01-28 VITALS
SYSTOLIC BLOOD PRESSURE: 102 MMHG | WEIGHT: 211 LBS | BODY MASS INDEX: 33.05 KG/M2 | HEART RATE: 64 BPM | OXYGEN SATURATION: 99 % | DIASTOLIC BLOOD PRESSURE: 76 MMHG

## 2021-01-28 DIAGNOSIS — G43.909 MIGRAINE WITHOUT STATUS MIGRAINOSUS, NOT INTRACTABLE, UNSPECIFIED MIGRAINE TYPE: Primary | ICD-10-CM

## 2021-01-28 PROCEDURE — 99214 OFFICE O/P EST MOD 30 MIN: CPT | Performed by: NURSE PRACTITIONER

## 2021-01-28 RX ORDER — EPTINEZUMAB-JJMR 100 MG/ML
300 INJECTION INTRAVENOUS
Qty: 3 VIAL | Refills: 5 | Status: SHIPPED | OUTPATIENT
Start: 2021-01-28 | End: 2021-01-28 | Stop reason: SDUPTHER

## 2021-01-28 NOTE — PROGRESS NOTES
Juanpablo Hall is a 39 y.o. female who presents with the following  Chief Complaint   Patient presents with    Follow-up       HPI    FU for migraine. Having between 7-11 migraines a month. A few of these a month may need Toradol to stay out of ER. Treximet usually works well but if not the IM helps keep away. Carolina Mcguire did not help. 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   light, sound sensitive. Nausea, dizziness. Nothing else has changed. Failed AImovig before this. And AED, SSRI and can not take BP medications due to hypotension   Failed Trokendi, Topamax, Elavil. Currently on Ajovy,         No Known Allergies    Current Outpatient Medications   Medication Sig    SUMAtriptan-naproxen (TREXIMET)  mg tablet TAKE 1 TABLET BY MOUTH AT ONSET OF HEADACHE. MAY REPEAT AFTER 2 HOURS. MAX 2 DOSES DAILY    ubrogepant (UBRELVY) 100 mg tablet 1 at HA onset and repeat in 2 hours if needed. Max 2 in 24 hours BIN: [id number] PCN: [id number] GRP; [id number] ID: [id number] please use this card    ketorolac (TORADOL) 30 mg/mL (1 mL) injection Inject 1 ML IM at HA onset as needed every 8 hours up to 3 days a week    Syringe with Needle, Safety 1 mL 27 gauge x 1/2\" syrg Use 1 syringe as needed for IM injection    levonorgestrel (MIRENA) 20 mcg/24 hr (5 years) IUD 1 Device by IntraUTERine route once.  ARMOUR THYROID 30 mg tablet Take 105 mcg by mouth daily.  multivitamin (ONE A DAY) tablet Take 1 Tab by mouth daily.  CHOLECALCIFEROL, VITAMIN D3, (VITAMIN D3 PO) Take  by mouth.  FERROUS FUMARATE/VIT BCOMP,C (SUPER B COMPLEX PO) Take  by mouth.  FERROUS SULFATE (IRON PO) Take  by mouth.  eptinezumab-jjmr (Vyepti) 100 mg/mL injection 3 mL by IntraVENous route every three (3) months. No current facility-administered medications for this visit.         Social History     Tobacco Use   Smoking Status Never Smoker   Smokeless Tobacco Never Used       Past Medical History:   Diagnosis Date    Anxiety     Fatigue     Headache     Nausea     Ringing in ears     Thyroid disease     Vertigo     Visual disturbance        Past Surgical History:   Procedure Laterality Date    HX HEENT      right thyroid removal       Family History   Problem Relation Age of Onset    Headache Father     Heart Disease Father     Neuropathy Father     Stroke Father     Diabetes Father     Dementia Maternal Grandmother     Cancer Paternal Grandfather        Social History     Socioeconomic History    Marital status:      Spouse name: Not on file    Number of children: Not on file    Years of education: Not on file    Highest education level: Not on file   Tobacco Use    Smoking status: Never Smoker    Smokeless tobacco: Never Used   Substance and Sexual Activity    Alcohol use: No       Review of Systems   Eyes: Positive for blurred vision, double vision and photophobia. Gastrointestinal: Positive for nausea. Negative for vomiting. Neurological: Positive for dizziness and headaches. Negative for tingling, tremors and speech change. Remainder of comprehensive review is negative. Physical Exam :    Visit Vitals  /76   Pulse 64   Wt 95.7 kg (211 lb)   SpO2 99%   BMI 33.05 kg/m²       General: Well defined, nourished, and groomed individual in no acute distress.    Neck: Supple, nontender, no bruits, no pain with resistance to active range of motion.    Heart: Regular rate and rhythm, no murmurs, rub, or gallop. Normal S1S2. Lungs: Clear to auscultation bilaterally with equal chest expansion, no cough, no wheeze  Musculoskeletal: Extremities revealed no edema and had full range of motion of joints.    Psych: Good mood and bright affect    NEUROLOGICAL EXAMINATION:    Mental Status: Alert and oriented to person, place, and time    Cranial Nerves:    II, III, IV, VI: Visual acuity grossly intact.  Visual fields are normal.    Pupils are equal, round, and reactive to light and accommodation.    Extra-ocular movements are full and fluid. Fundoscopic exam was benign, no ptosis or nystagmus.    V-XII: Hearing is grossly intact. Facial features are symmetric, with normal sensation and strength. The palate rises symmetrically and the tongue protrudes midline. Sternocleidomastoids 5/5. Motor Examination: Normal tone, bulk, and strength, 5/5 muscle strength throughout. Coordination: Finger to nose was normal. No resting or intention tremor    Gait and Station: Steady while walking. Normal arm swing. No pronator drift. No muscle wasting or fasiculations noted. Reflexes: DTRs 2+ throughout. Results for orders placed or performed during the hospital encounter of 03/07/20   URINE CULTURE HOLD SAMPLE    Specimen: Serum   Result Value Ref Range    Urine culture hold        Urine on hold in Microbiology dept for 2 days. If unpreserved urine is submitted, it cannot be used for addtional testing after 24 hours, recollection will be required. CBC WITH AUTOMATED DIFF   Result Value Ref Range    WBC 6.4 3.6 - 11.0 K/uL    RBC 5.12 3.80 - 5.20 M/uL    HGB 15.1 11.5 - 16.0 g/dL    HCT 46.4 35.0 - 47.0 %    MCV 90.6 80.0 - 99.0 FL    MCH 29.5 26.0 - 34.0 PG    MCHC 32.5 30.0 - 36.5 g/dL    RDW 12.9 11.5 - 14.5 %    PLATELET 302 195 - 430 K/uL    MPV 9.6 8.9 - 12.9 FL    NRBC 0.0 0  WBC    ABSOLUTE NRBC 0.00 0.00 - 0.01 K/uL    NEUTROPHILS 66 32 - 75 %    LYMPHOCYTES 23 12 - 49 %    MONOCYTES 8 5 - 13 %    EOSINOPHILS 2 0 - 7 %    BASOPHILS 1 0 - 1 %    IMMATURE GRANULOCYTES 0 0.0 - 0.5 %    ABS. NEUTROPHILS 4.2 1.8 - 8.0 K/UL    ABS. LYMPHOCYTES 1.5 0.8 - 3.5 K/UL    ABS. MONOCYTES 0.5 0.0 - 1.0 K/UL    ABS. EOSINOPHILS 0.1 0.0 - 0.4 K/UL    ABS. BASOPHILS 0.1 0.0 - 0.1 K/UL    ABS. IMM.  GRANS. 0.0 0.00 - 0.04 K/UL    DF AUTOMATED     METABOLIC PANEL, COMPREHENSIVE   Result Value Ref Range    Sodium 140 136 - 145 mmol/L Potassium 4.0 3.5 - 5.1 mmol/L    Chloride 105 97 - 108 mmol/L    CO2 29 21 - 32 mmol/L    Anion gap 6 5 - 15 mmol/L    Glucose 84 65 - 100 mg/dL    BUN 16 6 - 20 MG/DL    Creatinine 0.76 0.55 - 1.02 MG/DL    BUN/Creatinine ratio 21 (H) 12 - 20      GFR est AA >60 >60 ml/min/1.73m2    GFR est non-AA >60 >60 ml/min/1.73m2    Calcium 9.3 8.5 - 10.1 MG/DL    Bilirubin, total 0.6 0.2 - 1.0 MG/DL    ALT (SGPT) 27 12 - 78 U/L    AST (SGOT) 13 (L) 15 - 37 U/L    Alk. phosphatase 82 45 - 117 U/L    Protein, total 7.7 6.4 - 8.2 g/dL    Albumin 4.4 3.5 - 5.0 g/dL    Globulin 3.3 2.0 - 4.0 g/dL    A-G Ratio 1.3 1.1 - 2.2     URINALYSIS W/MICROSCOPIC   Result Value Ref Range    Color YELLOW/STRAW      Appearance TURBID (A) CLEAR      Specific gravity 1.017 1.003 - 1.030      pH (UA) 8.0 5.0 - 8.0      Protein NEGATIVE  NEG mg/dL    Glucose NEGATIVE  NEG mg/dL    Ketone NEGATIVE  NEG mg/dL    Bilirubin NEGATIVE  NEG      Blood NEGATIVE  NEG      Urobilinogen 0.2 0.2 - 1.0 EU/dL    Nitrites NEGATIVE  NEG      Leukocyte Esterase NEGATIVE  NEG      WBC 0-4 0 - 4 /hpf    RBC 0-5 0 - 5 /hpf    Epithelial cells FEW FEW /lpf    Bacteria 1+ (A) NEG /hpf    Amorphous Crystals 1+ (A) NEG   SAMPLES BEING HELD   Result Value Ref Range    SAMPLES BEING HELD 1SST     COMMENT        Add-on orders for these samples will be processed based on acceptable specimen integrity and analyte stability, which may vary by analyte. HCG URINE, QL. - POC   Result Value Ref Range    Pregnancy test,urine (POC) NEGATIVE  NEG         Orders Placed This Encounter    DISCONTD: eptinezumab-jjmr (Vyepti) 100 mg/mL injection     Sig: 3 mL by IntraVENous route every three (3) months. Dispense:  3 Vial     Refill:  5       No diagnosis found. Stop Ajovy and initiate Vyepti 300 mg every 3 months for migraine. Discussed side effects and administration. Continue Treximet, Toradol for PRN if needed. Zunidla Rose and Nurtec no work. Keep track of HA.    Also failed AED, SSRI and BP medications.    FU 6 weeks after infusion             This note will not be viewable in [employer]

## 2021-06-25 ENCOUNTER — OFFICE VISIT (OUTPATIENT)
Dept: NEUROLOGY | Age: 46
End: 2021-06-25
Payer: COMMERCIAL

## 2021-06-25 VITALS
BODY MASS INDEX: 31.52 KG/M2 | HEIGHT: 68 IN | RESPIRATION RATE: 16 BRPM | OXYGEN SATURATION: 100 % | WEIGHT: 208 LBS | HEART RATE: 82 BPM | DIASTOLIC BLOOD PRESSURE: 90 MMHG | SYSTOLIC BLOOD PRESSURE: 122 MMHG

## 2021-06-25 DIAGNOSIS — F41.9 ANXIETY: Primary | ICD-10-CM

## 2021-06-25 DIAGNOSIS — G43.119 MIGRAINE WITH AURA, INTRACTABLE, WITHOUT STATUS MIGRAINOSUS: ICD-10-CM

## 2021-06-25 PROCEDURE — 99214 OFFICE O/P EST MOD 30 MIN: CPT | Performed by: NURSE PRACTITIONER

## 2021-06-25 RX ORDER — ALPRAZOLAM 0.25 MG/1
TABLET ORAL
Qty: 60 TABLET | Refills: 4 | Status: SHIPPED | OUTPATIENT
Start: 2021-06-25 | End: 2021-10-11 | Stop reason: ALTCHOICE

## 2021-06-25 RX ORDER — SUMATRIPTAN AND NAPROXEN SODIUM 85; 500 MG/1; MG/1
TABLET, FILM COATED ORAL
Qty: 9 TABLET | Refills: 4 | Status: SHIPPED | OUTPATIENT
Start: 2021-06-25 | End: 2021-10-14

## 2021-06-25 NOTE — PROGRESS NOTES
Doreen Davis is a 39 y.o. female who presents with the following  Chief Complaint   Patient presents with    Follow-up    Migraine     patient states she started the infusions she has had 4-6 migraines. no other concerns at this time    Medication Refill     sumatriptan       HPI     FU for migraine. Before Vyepti she was having about 8-10 migraines a month   Is X 2 infusions and cut to about 4 per month now. Not as painful normally but has had to use Toradol IM a few times    Treximet usually works well but if not the IM helps keep away. Garrick Sharon did not help.      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      HA are located unilateral, intense, pulsating.   light, sound sensitive. Nausea, dizziness. Nothing else has changed. Failed AImovig, Ajovy. No BP medications due to hypotension   Failed Trokendi, Topamax, Elavil. No Known Allergies    Current Outpatient Medications   Medication Sig    SUMAtriptan-naproxen (TREXIMET)  mg tablet TAKE 1 TABLET BY MOUTH AT ONSET OF HEADACHE. MAY REPEAT AFTER 2 HOURS. MAX 2 DOSES DAILY    ALPRAZolam (XANAX) 0.25 mg tablet Take 1-2 tablets by mouth daily as needed    eptinezumab-jjmr (Vyepti) 100 mg/mL injection 3 mL by IntraVENous route every three (3) months.  ketorolac (TORADOL) 30 mg/mL (1 mL) injection Inject 1 ML IM at HA onset as needed every 8 hours up to 3 days a week    Syringe with Needle, Safety 1 mL 27 gauge x 1/2\" syrg Use 1 syringe as needed for IM injection    levonorgestrel (MIRENA) 20 mcg/24 hr (5 years) IUD 1 Device by IntraUTERine route once.  ARMOUR THYROID 30 mg tablet Take 105 mcg by mouth daily.  multivitamin (ONE A DAY) tablet Take 1 Tab by mouth daily.  CHOLECALCIFEROL, VITAMIN D3, (VITAMIN D3 PO) Take  by mouth.  FERROUS FUMARATE/VIT BCOMP,C (SUPER B COMPLEX PO) Take  by mouth.      No current facility-administered medications for this visit. Social History     Tobacco Use   Smoking Status Never Smoker   Smokeless Tobacco Never Used       Past Medical History:   Diagnosis Date    Anxiety     Fatigue     Headache     Nausea     Ringing in ears     Thyroid disease     Vertigo     Visual disturbance        Past Surgical History:   Procedure Laterality Date    HX HEENT      right thyroid removal       Family History   Problem Relation Age of Onset    Headache Father     Heart Disease Father     Neuropathy Father     Stroke Father     Diabetes Father     Dementia Maternal Grandmother     Cancer Paternal Grandfather        Social History     Socioeconomic History    Marital status:      Spouse name: Not on file    Number of children: Not on file    Years of education: Not on file    Highest education level: Not on file   Tobacco Use    Smoking status: Never Smoker    Smokeless tobacco: Never Used   Substance and Sexual Activity    Alcohol use: No     Social Determinants of Health     Financial Resource Strain:     Difficulty of Paying Living Expenses:    Food Insecurity:     Worried About Running Out of Food in the Last Year:     Ran Out of Food in the Last Year:    Transportation Needs:     Lack of Transportation (Medical):  Lack of Transportation (Non-Medical):    Physical Activity:     Days of Exercise per Week:     Minutes of Exercise per Session:    Stress:     Feeling of Stress :    Social Connections:     Frequency of Communication with Friends and Family:     Frequency of Social Gatherings with Friends and Family:     Attends [employer] Services:     Active Member of Clubs or Organizations:     Attends Club or Organization Meetings:     Marital Status:        Review of Systems   Eyes: Positive for blurred vision and photophobia. Negative for double vision. Respiratory: Negative for shortness of breath and wheezing.     Gastrointestinal: Negative for nausea and vomiting. Neurological: Positive for headaches. Negative for dizziness, tingling, tremors, sensory change, seizures and loss of consciousness. Remainder of comprehensive review is negative. Physical Exam :    Visit Vitals  BP (!) 122/90 (BP 1 Location: Left upper arm, BP Patient Position: Sitting)   Pulse 82   Resp 16   Ht 5' 8\" (1.727 m)   Wt 94.3 kg (208 lb)   SpO2 100%   BMI 31.63 kg/m²       General: Well defined, nourished, and groomed individual in no acute distress.    Neck: Supple, nontender, no bruits, no pain with resistance to active range of motion.    Musculoskeletal: Extremities revealed no edema and had full range of motion of joints.    Psych: Good mood and bright affect    NEUROLOGICAL EXAMINATION:    Mental Status: Alert and oriented to person, place, and time    Cranial Nerves:    II, III, IV, VI: Visual acuity grossly intact. Visual fields are normal.    Pupils are equal, round, and reactive to light and accommodation.    Extra-ocular movements are full and fluid. Fundoscopic exam was benign, no ptosis or nystagmus.    V-XII: Hearing is grossly intact. Facial features are symmetric, with normal sensation and strength. The palate rises symmetrically and the tongue protrudes midline. Sternocleidomastoids 5/5. Motor Examination: Normal tone, bulk, and strength, 5/5 muscle strength throughout. Coordination: Finger to nose was normal. No resting or intention tremor    Gait and Station: Steady while walking. Normal arm swing. No pronator drift. No muscle wasting or fasiculations noted. Reflexes: DTRs 2+ throughout. Results for orders placed or performed during the hospital encounter of 03/07/20   URINE CULTURE HOLD SAMPLE    Specimen: Serum   Result Value Ref Range    Urine culture hold        Urine on hold in Microbiology dept for 2 days.   If unpreserved urine is submitted, it cannot be used for addtional testing after 24 hours, recollection will be required. CBC WITH AUTOMATED DIFF   Result Value Ref Range    WBC 6.4 3.6 - 11.0 K/uL    RBC 5.12 3.80 - 5.20 M/uL    HGB 15.1 11.5 - 16.0 g/dL    HCT 46.4 35.0 - 47.0 %    MCV 90.6 80.0 - 99.0 FL    MCH 29.5 26.0 - 34.0 PG    MCHC 32.5 30.0 - 36.5 g/dL    RDW 12.9 11.5 - 14.5 %    PLATELET 615 999 - 818 K/uL    MPV 9.6 8.9 - 12.9 FL    NRBC 0.0 0  WBC    ABSOLUTE NRBC 0.00 0.00 - 0.01 K/uL    NEUTROPHILS 66 32 - 75 %    LYMPHOCYTES 23 12 - 49 %    MONOCYTES 8 5 - 13 %    EOSINOPHILS 2 0 - 7 %    BASOPHILS 1 0 - 1 %    IMMATURE GRANULOCYTES 0 0.0 - 0.5 %    ABS. NEUTROPHILS 4.2 1.8 - 8.0 K/UL    ABS. LYMPHOCYTES 1.5 0.8 - 3.5 K/UL    ABS. MONOCYTES 0.5 0.0 - 1.0 K/UL    ABS. EOSINOPHILS 0.1 0.0 - 0.4 K/UL    ABS. BASOPHILS 0.1 0.0 - 0.1 K/UL    ABS. IMM. GRANS. 0.0 0.00 - 0.04 K/UL    DF AUTOMATED     METABOLIC PANEL, COMPREHENSIVE   Result Value Ref Range    Sodium 140 136 - 145 mmol/L    Potassium 4.0 3.5 - 5.1 mmol/L    Chloride 105 97 - 108 mmol/L    CO2 29 21 - 32 mmol/L    Anion gap 6 5 - 15 mmol/L    Glucose 84 65 - 100 mg/dL    BUN 16 6 - 20 MG/DL    Creatinine 0.76 0.55 - 1.02 MG/DL    BUN/Creatinine ratio 21 (H) 12 - 20      GFR est AA >60 >60 ml/min/1.73m2    GFR est non-AA >60 >60 ml/min/1.73m2    Calcium 9.3 8.5 - 10.1 MG/DL    Bilirubin, total 0.6 0.2 - 1.0 MG/DL    ALT (SGPT) 27 12 - 78 U/L    AST (SGOT) 13 (L) 15 - 37 U/L    Alk.  phosphatase 82 45 - 117 U/L    Protein, total 7.7 6.4 - 8.2 g/dL    Albumin 4.4 3.5 - 5.0 g/dL    Globulin 3.3 2.0 - 4.0 g/dL    A-G Ratio 1.3 1.1 - 2.2     URINALYSIS W/MICROSCOPIC   Result Value Ref Range    Color YELLOW/STRAW      Appearance TURBID (A) CLEAR      Specific gravity 1.017 1.003 - 1.030      pH (UA) 8.0 5.0 - 8.0      Protein NEGATIVE  NEG mg/dL    Glucose NEGATIVE  NEG mg/dL    Ketone NEGATIVE  NEG mg/dL    Bilirubin NEGATIVE  NEG      Blood NEGATIVE  NEG      Urobilinogen 0.2 0.2 - 1.0 EU/dL    Nitrites NEGATIVE  NEG      Leukocyte Esterase NEGATIVE  NEG      WBC 0-4 0 - 4 /hpf    RBC 0-5 0 - 5 /hpf    Epithelial cells FEW FEW /lpf    Bacteria 1+ (A) NEG /hpf    Amorphous Crystals 1+ (A) NEG   SAMPLES BEING HELD   Result Value Ref Range    SAMPLES BEING HELD 1SST     COMMENT        Add-on orders for these samples will be processed based on acceptable specimen integrity and analyte stability, which may vary by analyte. HCG URINE, QL. - POC   Result Value Ref Range    Pregnancy test,urine (POC) NEGATIVE  NEG         Orders Placed This Encounter    SUMAtriptan-naproxen (TREXIMET)  mg tablet     Sig: TAKE 1 TABLET BY MOUTH AT ONSET OF HEADACHE. MAY REPEAT AFTER 2 HOURS. MAX 2 DOSES DAILY     Dispense:  9 Tablet     Refill:  4    ALPRAZolam (XANAX) 0.25 mg tablet     Sig: Take 1-2 tablets by mouth daily as needed     Dispense:  60 Tablet     Refill:  4       1. Anxiety    2. Migraine with aura, intractable, without status migrainosus      Keep Vyepti 300 mg every 3 months as this has been working well for migraine prevention thus far  Next infusion due in August.   Keep track of HA and symptoms. Using Treximet PRN   Toradol if needed IM and has used a few times since last visit. Try PRN xanax for situational anxiety.                  This note will not be viewable in [employer]

## 2021-10-11 ENCOUNTER — OFFICE VISIT (OUTPATIENT)
Dept: NEUROLOGY | Age: 46
End: 2021-10-11
Payer: COMMERCIAL

## 2021-10-11 VITALS
WEIGHT: 211.3 LBS | HEART RATE: 67 BPM | TEMPERATURE: 94.1 F | BODY MASS INDEX: 32.02 KG/M2 | DIASTOLIC BLOOD PRESSURE: 66 MMHG | SYSTOLIC BLOOD PRESSURE: 106 MMHG | HEIGHT: 68 IN | OXYGEN SATURATION: 98 %

## 2021-10-11 DIAGNOSIS — G43.909 MIGRAINE WITHOUT STATUS MIGRAINOSUS, NOT INTRACTABLE, UNSPECIFIED MIGRAINE TYPE: Primary | ICD-10-CM

## 2021-10-11 PROCEDURE — 99214 OFFICE O/P EST MOD 30 MIN: CPT | Performed by: NURSE PRACTITIONER

## 2021-10-11 RX ORDER — CHOLECALCIFEROL (VITAMIN D3) 125 MCG
100 CAPSULE ORAL DAILY
COMMUNITY

## 2021-10-11 RX ORDER — SUMATRIPTAN 3 MG/1
INJECTION, SOLUTION SUBCUTANEOUS
Qty: 8 ML | Refills: 5 | Status: SHIPPED | OUTPATIENT
Start: 2021-10-11

## 2021-10-11 RX ORDER — CHOLECALCIFEROL (VITAMIN D3) 50 MCG
1 CAPSULE ORAL DAILY
COMMUNITY

## 2021-10-11 RX ORDER — OMEGA-3-ACID ETHYL ESTERS 1 G/1
1 CAPSULE, LIQUID FILLED ORAL
COMMUNITY

## 2021-10-11 RX ORDER — CALCIUM CARBONATE/VITAMIN D3 600 MG-125
1 TABLET ORAL DAILY
COMMUNITY

## 2021-10-11 NOTE — PROGRESS NOTES
Soniya Pelletier is a 42 y.o. female who presents with the following  Chief Complaint   Patient presents with    Migraine       HPI     FU for migraine with 300 mg Vyepti. Before Vyepti she was having about 8-10 migraines a month   Is X 3 infusions and cut to about 4-6 per month now. Not as painful normally and has not needed IM toradol   Treximet usually works well but if not the IM helps keep away. Does wake up with HA and nothing to use that works  [email or web address] Nurtec, Ubrelvy did not help.     HA are located unilateral, intense, pulsating.   light, sound sensitive. Nausea, dizziness.      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 Hopefully will be better as school continues.      Failed Aimovig Ajovy. No BP medications due to hypotension   Failed Trokendi Topamax, Elavil. No Known Allergies    Current Outpatient Medications   Medication Sig    omega-3 acid ethyl esters (LOVAZA) 1 gram capsule Take 1 g by mouth daily (with breakfast).  calcium-cholecalciferol, d3, (CALCIUM 600 + D) 600-125 mg-unit tab Take 1 Tablet by mouth daily.  co-enzyme Q-10 (Co Q-10) 100 mg capsule Take 100 mg by mouth daily.  B.infantis-B.ani-B.long-B.bifi (Probiotic 4X) 10-15 mg TbEC Take 1 Capsule by mouth daily.  SUMAtriptan succinate (Zembrace Symtouch) 3 mg/0.5 mL pnij Inject 1 syringe IM at HA onset. May repeat X 1 dose every hour up to 4 doses. Max 2 doses in 24 hours.  SUMAtriptan-naproxen (TREXIMET)  mg tablet TAKE 1 TABLET BY MOUTH AT ONSET OF HEADACHE. MAY REPEAT AFTER 2 HOURS. MAX 2 DOSES DAILY    eptinezumab-jjmr (Vyepti) 100 mg/mL injection 3 mL by IntraVENous route every three (3) months.     ketorolac (TORADOL) 30 mg/mL (1 mL) injection Inject 1 ML IM at HA onset as needed every 8 hours up to 3 days a week    Syringe with Needle, Safety 1 mL 27 gauge x 1/2\" syrg Use 1 syringe as needed for IM injection    levonorgestrel (MIRENA) 20 mcg/24 hr (5 years) IUD 1 Device by IntraUTERine route once.  ARMOUR THYROID 30 mg tablet Take 105 mcg by mouth daily.  multivitamin (ONE A DAY) tablet Take 1 Tab by mouth daily.  CHOLECALCIFEROL, VITAMIN D3, (VITAMIN D3 PO) Take  by mouth.  FERROUS FUMARATE/VIT BCOMP,C (SUPER B COMPLEX PO) Take  by mouth. No current facility-administered medications for this visit. Social History     Tobacco Use   Smoking Status Never Smoker   Smokeless Tobacco Never Used       Past Medical History:   Diagnosis Date    Anxiety     Fatigue     Headache     Nausea     Ringing in ears     Thyroid disease     Vertigo     Visual disturbance        Past Surgical History:   Procedure Laterality Date    HX HEENT      right thyroid removal       Family History   Problem Relation Age of Onset    Headache Father     Heart Disease Father     Neuropathy Father     Stroke Father     Diabetes Father     Dementia Maternal Grandmother     Cancer Paternal Grandfather        Social History     Socioeconomic History    Marital status:      Spouse name: Not on file    Number of children: Not on file    Years of education: Not on file    Highest education level: Not on file   Tobacco Use    Smoking status: Never Smoker    Smokeless tobacco: Never Used   Substance and Sexual Activity    Alcohol use: No     Social Determinants of Health     Financial Resource Strain:     Difficulty of Paying Living Expenses:    Food Insecurity:     Worried About Running Out of Food in the Last Year:     Ran Out of Food in the Last Year:    Transportation Needs:     Lack of Transportation (Medical):      Lack of Transportation (Non-Medical):    Physical Activity:     Days of Exercise per Week:     Minutes of Exercise per Session:    Stress:     Feeling of Stress :    Social Connections:     Frequency of Communication with Friends and Family:     Frequency of Social Gatherings with Friends and Family:     Attends [employer] Services:     Active Member of Clubs or Organizations:     Attends Club or Organization Meetings:     Marital Status:        Review of Systems   Eyes: Positive for blurred vision and photophobia. Negative for double vision. Gastrointestinal: Positive for nausea. Negative for vomiting. Neurological: Positive for headaches. Negative for dizziness, seizures and loss of consciousness. Remainder of comprehensive review is negative. Physical Exam :    Visit Vitals  /66   Pulse 67   Temp (!) 94.1 °F (34.5 °C)   Ht 5' 8\" (1.727 m)   Wt 95.8 kg (211 lb 4.8 oz)   SpO2 98%   BMI 32.13 kg/m²       General: Well defined, nourished, and groomed individual in no acute distress.    Neck: Supple, nontender, no bruits, no pain with resistance to active range of motion.    Musculoskeletal: Extremities revealed no edema and had full range of motion of joints.    Psych: Good mood and bright affect    NEUROLOGICAL EXAMINATION:    Mental Status: Alert and oriented to person, place, and time    Cranial Nerves:    II, III, IV, VI: Visual acuity grossly intact. Visual fields are normal.    Pupils are equal, round, and reactive to light and accommodation.    Extra-ocular movements are full and fluid. Fundoscopic exam was benign, no ptosis or nystagmus.    V-XII: Hearing is grossly intact. Facial features are symmetric, with normal sensation and strength. The palate rises symmetrically and the tongue protrudes midline. Sternocleidomastoids 5/5. Motor Examination: Normal tone, bulk, and strength, 5/5 muscle strength throughout. Coordination: Finger to nose was normal. No resting or intention tremor    Gait and Station: Steady while walking. Normal arm swing. No pronator drift. No muscle wasting or fasiculations noted. Reflexes: DTRs 2+ throughout.             Results for orders placed or performed during the hospital encounter of 03/07/20   URINE CULTURE HOLD SAMPLE    Specimen: Serum   Result Value Ref Range Urine culture hold        Urine on hold in Microbiology dept for 2 days. If unpreserved urine is submitted, it cannot be used for addtional testing after 24 hours, recollection will be required. CBC WITH AUTOMATED DIFF   Result Value Ref Range    WBC 6.4 3.6 - 11.0 K/uL    RBC 5.12 3.80 - 5.20 M/uL    HGB 15.1 11.5 - 16.0 g/dL    HCT 46.4 35.0 - 47.0 %    MCV 90.6 80.0 - 99.0 FL    MCH 29.5 26.0 - 34.0 PG    MCHC 32.5 30.0 - 36.5 g/dL    RDW 12.9 11.5 - 14.5 %    PLATELET 266 814 - 702 K/uL    MPV 9.6 8.9 - 12.9 FL    NRBC 0.0 0  WBC    ABSOLUTE NRBC 0.00 0.00 - 0.01 K/uL    NEUTROPHILS 66 32 - 75 %    LYMPHOCYTES 23 12 - 49 %    MONOCYTES 8 5 - 13 %    EOSINOPHILS 2 0 - 7 %    BASOPHILS 1 0 - 1 %    IMMATURE GRANULOCYTES 0 0.0 - 0.5 %    ABS. NEUTROPHILS 4.2 1.8 - 8.0 K/UL    ABS. LYMPHOCYTES 1.5 0.8 - 3.5 K/UL    ABS. MONOCYTES 0.5 0.0 - 1.0 K/UL    ABS. EOSINOPHILS 0.1 0.0 - 0.4 K/UL    ABS. BASOPHILS 0.1 0.0 - 0.1 K/UL    ABS. IMM. GRANS. 0.0 0.00 - 0.04 K/UL    DF AUTOMATED     METABOLIC PANEL, COMPREHENSIVE   Result Value Ref Range    Sodium 140 136 - 145 mmol/L    Potassium 4.0 3.5 - 5.1 mmol/L    Chloride 105 97 - 108 mmol/L    CO2 29 21 - 32 mmol/L    Anion gap 6 5 - 15 mmol/L    Glucose 84 65 - 100 mg/dL    BUN 16 6 - 20 MG/DL    Creatinine 0.76 0.55 - 1.02 MG/DL    BUN/Creatinine ratio 21 (H) 12 - 20      GFR est AA >60 >60 ml/min/1.73m2    GFR est non-AA >60 >60 ml/min/1.73m2    Calcium 9.3 8.5 - 10.1 MG/DL    Bilirubin, total 0.6 0.2 - 1.0 MG/DL    ALT (SGPT) 27 12 - 78 U/L    AST (SGOT) 13 (L) 15 - 37 U/L    Alk.  phosphatase 82 45 - 117 U/L    Protein, total 7.7 6.4 - 8.2 g/dL    Albumin 4.4 3.5 - 5.0 g/dL    Globulin 3.3 2.0 - 4.0 g/dL    A-G Ratio 1.3 1.1 - 2.2     URINALYSIS W/MICROSCOPIC   Result Value Ref Range    Color YELLOW/STRAW      Appearance TURBID (A) CLEAR      Specific gravity 1.017 1.003 - 1.030      pH (UA) 8.0 5.0 - 8.0      Protein NEGATIVE  NEG mg/dL    Glucose NEGATIVE NEG mg/dL    Ketone NEGATIVE  NEG mg/dL    Bilirubin NEGATIVE  NEG      Blood NEGATIVE  NEG      Urobilinogen 0.2 0.2 - 1.0 EU/dL    Nitrites NEGATIVE  NEG      Leukocyte Esterase NEGATIVE  NEG      WBC 0-4 0 - 4 /hpf    RBC 0-5 0 - 5 /hpf    Epithelial cells FEW FEW /lpf    Bacteria 1+ (A) NEG /hpf    Amorphous Crystals 1+ (A) NEG   SAMPLES BEING HELD   Result Value Ref Range    SAMPLES BEING HELD 1SST     COMMENT        Add-on orders for these samples will be processed based on acceptable specimen integrity and analyte stability, which may vary by analyte. HCG URINE, QL. - POC   Result Value Ref Range    Pregnancy test,urine (POC) NEGATIVE  NEG         Orders Placed This Encounter    omega-3 acid ethyl esters (LOVAZA) 1 gram capsule     Sig: Take 1 g by mouth daily (with breakfast).  calcium-cholecalciferol, d3, (CALCIUM 600 + D) 600-125 mg-unit tab     Sig: Take 1 Tablet by mouth daily.  co-enzyme Q-10 (Co Q-10) 100 mg capsule     Sig: Take 100 mg by mouth daily.  B.infantis-B.ani-B.long-B.bifi (Probiotic 4X) 10-15 mg TbEC     Sig: Take 1 Capsule by mouth daily.  SUMAtriptan succinate (Zembrace Symtouch) 3 mg/0.5 mL pnij     Sig: Inject 1 syringe IM at HA onset. May repeat X 1 dose every hour up to 4 doses. Max 2 doses in 24 hours. Dispense:  8 mL     Refill:  5     1. Migraine       Keep Vyepti 300 mg every 3 months   Due in November and we will FU in January to see how things are going   Keep Treximet. Can try Zembrace for PRN needed when waking up with HA.    Use Toradol PRN but has not needed  Track HA and monitor stress, etc.           This note will not be viewable in [employer]

## 2021-10-14 DIAGNOSIS — G43.119 MIGRAINE WITH AURA, INTRACTABLE, WITHOUT STATUS MIGRAINOSUS: ICD-10-CM

## 2021-10-14 RX ORDER — SUMATRIPTAN AND NAPROXEN SODIUM 85; 500 MG/1; MG/1
TABLET, FILM COATED ORAL
Qty: 9 TABLET | Refills: 4 | Status: SHIPPED | OUTPATIENT
Start: 2021-10-14 | End: 2022-02-28

## 2021-10-15 ENCOUNTER — TELEPHONE (OUTPATIENT)
Dept: NEUROLOGY | Age: 46
End: 2021-10-15

## 2021-10-15 NOTE — TELEPHONE ENCOUNTER
Rx was sent in 10/11 for Zembrace. The directions say for the patient to use as a IM but the medication is SubQ injection. Pharmacists needs permission to change it.      Oskar Bruce  [phone]

## 2021-10-18 NOTE — TELEPHONE ENCOUNTER
Spoke with Silke Mak, pharmacist @ [address] [place] [address], and requested [employer] be changes to SubQ. Silke Mak will make a note of the change.

## 2021-10-29 RX ORDER — KETOROLAC TROMETHAMINE 30 MG/ML
INJECTION, SOLUTION INTRAMUSCULAR; INTRAVENOUS
Qty: 4 ML | Refills: 5 | Status: SHIPPED | OUTPATIENT
Start: 2021-10-29 | End: 2021-10-31 | Stop reason: SDUPTHER

## 2021-11-01 RX ORDER — KETOROLAC TROMETHAMINE 30 MG/ML
INJECTION, SOLUTION INTRAMUSCULAR; INTRAVENOUS
Qty: 4 ML | Refills: 5
Start: 2021-11-01

## 2021-11-18 ENCOUNTER — TELEPHONE (OUTPATIENT)
Dept: NEUROLOGY | Age: 46
End: 2021-11-18

## 2021-11-18 NOTE — TELEPHONE ENCOUNTER
Re: Sonal Morejon fax from pharmacy asking for PA update    Created case in [id number]# [id number]    Submitted and awaiting update.

## 2021-12-02 ENCOUNTER — TELEPHONE (OUTPATIENT)
Dept: NEUROLOGY | Age: 46
End: 2021-12-02

## 2022-02-10 ENCOUNTER — TELEPHONE (OUTPATIENT)
Dept: NEUROLOGY | Age: 47
End: 2022-02-10

## 2022-02-10 ENCOUNTER — OFFICE VISIT (OUTPATIENT)
Dept: NEUROLOGY | Age: 47
End: 2022-02-10
Payer: COMMERCIAL

## 2022-02-10 VITALS
SYSTOLIC BLOOD PRESSURE: 132 MMHG | HEART RATE: 80 BPM | TEMPERATURE: 97.4 F | DIASTOLIC BLOOD PRESSURE: 80 MMHG | OXYGEN SATURATION: 97 %

## 2022-02-10 DIAGNOSIS — G43.109 CHRONIC MIGRAINE WITH AURA: Primary | ICD-10-CM

## 2022-02-10 PROCEDURE — 99214 OFFICE O/P EST MOD 30 MIN: CPT | Performed by: NURSE PRACTITIONER

## 2022-02-10 RX ORDER — MELATONIN 5 MG
5 CAPSULE ORAL
COMMUNITY

## 2022-02-10 RX ORDER — BUSPIRONE HYDROCHLORIDE 5 MG/1
TABLET ORAL
Qty: 60 TABLET | Refills: 5 | Status: SHIPPED | OUTPATIENT
Start: 2022-02-10

## 2022-02-10 RX ORDER — ONABOTULINUMTOXINA 200 [USP'U]/1
INJECTION, POWDER, LYOPHILIZED, FOR SOLUTION INTRADERMAL; INTRAMUSCULAR
Qty: 200 UNITS | Refills: 5 | Status: SHIPPED | OUTPATIENT
Start: 2022-02-10 | End: 2022-03-08 | Stop reason: SDUPTHER

## 2022-02-10 NOTE — PROGRESS NOTES
Noemi Rodriguez is a 42 y.o. female who presents with the following  Chief Complaint   Patient presents with    Migraine     anxiety       HPI    FU chronic migraine   Continuing to have chronic migraine   Even on Vyepti, having 15 migraines or more a month   Lasting 8 hours or longer. Still painful, debilitating. Had needed the IM toradol more now also. Treximet usually works well but if not the IM helps keep away. Sergio Brewster works well. Regine Livingston did not help.      HA are located unilateral, intense, pulsating.   light, sound sensitive. Nausea, dizziness.      She is working as a principal   Weather is a trigger. Stress is high. Hopefully will be better as school continues.      Failed Aimovig, Ajovy. No BP medications due to hypotension   Failed Trokendi, Topamax, Elavil.   No Known Allergies    Current Outpatient Medications   Medication Sig    melatonin 5 mg cap capsule Take 5 mg by mouth nightly as needed.  onabotulinumtoxinA (Botox) 200 unit injection Inject 155 units into 31 FDA indicated and approved sites in the head, face, neck every 3 months for chronic migraine.  busPIRone (BUSPAR) 5 mg tablet Take 1-2 tablets by mouth BID PRN    ketorolac (TORADOL) 30 mg/mL (1 mL) injection INJECT 1 MILLILITER INTRAMUSCULARLY AT HEADACHE ONSET AS NEEDED EVERY 8 HOURS UP TO 3 DAYS A WEEK    SUMAtriptan-naproxen (TREXIMET)  mg tablet TAKE 1 TABLET AT ONSET OF HEADACHE, MAY REPEAT 1 DOSE IN 2 HOUR, MAX DOSE OF 2 TABLETS IN 24 HOURS    omega-3 acid ethyl esters (LOVAZA) 1 gram capsule Take 1 g by mouth daily (with breakfast).  calcium-cholecalciferol, d3, (CALCIUM 600 + D) 600-125 mg-unit tab Take 1 Tablet by mouth daily.  co-enzyme Q-10 (Co Q-10) 100 mg capsule Take 100 mg by mouth daily.  B.infantis-B.ani-B.long-B.bifi (Probiotic 4X) 10-15 mg TbEC Take 1 Capsule by mouth daily.  SUMAtriptan succinate (Zembrace Symtouch) 3 mg/0.5 mL pnij Inject 1 syringe IM at HA onset.  May repeat X 1 dose every hour up to 4 doses. Max 2 doses in 24 hours.  Syringe with Needle, Safety 1 mL 27 gauge x 1/2\" syrg Use 1 syringe as needed for IM injection    levonorgestrel (MIRENA) 20 mcg/24 hr (5 years) IUD 1 Device by IntraUTERine route once.  ARMOUR THYROID 30 mg tablet Take 105 mcg by mouth daily.  multivitamin (ONE A DAY) tablet Take 1 Tab by mouth daily.  CHOLECALCIFEROL, VITAMIN D3, (VITAMIN D3 PO) Take  by mouth.  FERROUS FUMARATE/VIT BCOMP,C (SUPER B COMPLEX PO) Take  by mouth. No current facility-administered medications for this visit. Social History     Tobacco Use   Smoking Status Never Smoker   Smokeless Tobacco Never Used       Past Medical History:   Diagnosis Date    Anxiety     Fatigue     Headache     Nausea     Ringing in ears     Thyroid disease     Vertigo     Visual disturbance        Past Surgical History:   Procedure Laterality Date    HX HEENT      right thyroid removal       Family History   Problem Relation Age of Onset    Headache Father     Heart Disease Father     Neuropathy Father     Stroke Father     Diabetes Father     Dementia Maternal Grandmother     Cancer Paternal Grandfather        Social History     Socioeconomic History    Marital status:    Tobacco Use    Smoking status: Never Smoker    Smokeless tobacco: Never Used   Substance and Sexual Activity    Alcohol use: No       Review of Systems   Eyes: Positive for blurred vision and photophobia. Negative for double vision. Respiratory: Negative for shortness of breath and wheezing. Cardiovascular: Negative for chest pain and palpitations. Gastrointestinal: Positive for nausea. Negative for vomiting. Musculoskeletal: Negative for falls. Neurological: Positive for dizziness and headaches. Negative for seizures, loss of consciousness and weakness. Psychiatric/Behavioral: The patient is nervous/anxious. Remainder of comprehensive review is negative. Physical Exam :    Visit Vitals  /80   Pulse 80   Temp 97.4 °F (36.3 °C)   SpO2 97%       General: Well defined, nourished, and groomed individual in no acute distress.    Neck: Supple, nontender, no bruits, no pain with resistance to active range of motion.    Musculoskeletal: Extremities revealed no edema and had full range of motion of joints.    Psych: Good mood and bright affect    NEUROLOGICAL EXAMINATION:    Mental Status: Alert and oriented to person, place, and time    Cranial Nerves:    II, III, IV, VI: Visual acuity grossly intact. Visual fields are normal.    Pupils are equal, round, and reactive to light and accommodation.    Extra-ocular movements are full and fluid. Fundoscopic exam was benign, no ptosis or nystagmus.    V-XII: Hearing is grossly intact. Facial features are symmetric, with normal sensation and strength. The palate rises symmetrically and the tongue protrudes midline. Sternocleidomastoids 5/5. Motor Examination: Normal tone, bulk, and strength, 5/5 muscle strength throughout. Coordination: Finger to nose was normal. No resting or intention tremor    Gait and Station: Steady while walking. Normal arm swing. No pronator drift. No muscle wasting or fasiculations noted. Reflexes: DTRs 2+ throughout. Results for orders placed or performed during the hospital encounter of 03/07/20   URINE CULTURE HOLD SAMPLE    Specimen: Serum   Result Value Ref Range    Urine culture hold        Urine on hold in Microbiology dept for 2 days. If unpreserved urine is submitted, it cannot be used for addtional testing after 24 hours, recollection will be required.    CBC WITH AUTOMATED DIFF   Result Value Ref Range    WBC 6.4 3.6 - 11.0 K/uL    RBC 5.12 3.80 - 5.20 M/uL    HGB 15.1 11.5 - 16.0 g/dL    HCT 46.4 35.0 - 47.0 %    MCV 90.6 80.0 - 99.0 FL    MCH 29.5 26.0 - 34.0 PG    MCHC 32.5 30.0 - 36.5 g/dL    RDW 12.9 11.5 - 14.5 %    PLATELET 742 108 - 197 K/uL    MPV 9.6 8.9 - 12.9 FL    NRBC 0.0 0  WBC    ABSOLUTE NRBC 0.00 0.00 - 0.01 K/uL    NEUTROPHILS 66 32 - 75 %    LYMPHOCYTES 23 12 - 49 %    MONOCYTES 8 5 - 13 %    EOSINOPHILS 2 0 - 7 %    BASOPHILS 1 0 - 1 %    IMMATURE GRANULOCYTES 0 0.0 - 0.5 %    ABS. NEUTROPHILS 4.2 1.8 - 8.0 K/UL    ABS. LYMPHOCYTES 1.5 0.8 - 3.5 K/UL    ABS. MONOCYTES 0.5 0.0 - 1.0 K/UL    ABS. EOSINOPHILS 0.1 0.0 - 0.4 K/UL    ABS. BASOPHILS 0.1 0.0 - 0.1 K/UL    ABS. IMM. GRANS. 0.0 0.00 - 0.04 K/UL    DF AUTOMATED     METABOLIC PANEL, COMPREHENSIVE   Result Value Ref Range    Sodium 140 136 - 145 mmol/L    Potassium 4.0 3.5 - 5.1 mmol/L    Chloride 105 97 - 108 mmol/L    CO2 29 21 - 32 mmol/L    Anion gap 6 5 - 15 mmol/L    Glucose 84 65 - 100 mg/dL    BUN 16 6 - 20 MG/DL    Creatinine 0.76 0.55 - 1.02 MG/DL    BUN/Creatinine ratio 21 (H) 12 - 20      GFR est AA >60 >60 ml/min/1.73m2    GFR est non-AA >60 >60 ml/min/1.73m2    Calcium 9.3 8.5 - 10.1 MG/DL    Bilirubin, total 0.6 0.2 - 1.0 MG/DL    ALT (SGPT) 27 12 - 78 U/L    AST (SGOT) 13 (L) 15 - 37 U/L    Alk.  phosphatase 82 45 - 117 U/L    Protein, total 7.7 6.4 - 8.2 g/dL    Albumin 4.4 3.5 - 5.0 g/dL    Globulin 3.3 2.0 - 4.0 g/dL    A-G Ratio 1.3 1.1 - 2.2     URINALYSIS W/MICROSCOPIC   Result Value Ref Range    Color YELLOW/STRAW      Appearance TURBID (A) CLEAR      Specific gravity 1.017 1.003 - 1.030      pH (UA) 8.0 5.0 - 8.0      Protein NEGATIVE  NEG mg/dL    Glucose NEGATIVE  NEG mg/dL    Ketone NEGATIVE  NEG mg/dL    Bilirubin NEGATIVE  NEG      Blood NEGATIVE  NEG      Urobilinogen 0.2 0.2 - 1.0 EU/dL    Nitrites NEGATIVE  NEG      Leukocyte Esterase NEGATIVE  NEG      WBC 0-4 0 - 4 /hpf    RBC 0-5 0 - 5 /hpf    Epithelial cells FEW FEW /lpf    Bacteria 1+ (A) NEG /hpf    Amorphous Crystals 1+ (A) NEG   SAMPLES BEING HELD   Result Value Ref Range    SAMPLES BEING HELD 1SST     COMMENT        Add-on orders for these samples will be processed based on acceptable specimen integrity and analyte stability, which may vary by analyte. HCG URINE, QL. - POC   Result Value Ref Range    Pregnancy test,urine (POC) NEGATIVE  NEG         Orders Placed This Encounter    REFERRAL TO NEUROLOGY     Referral Priority:   Routine     Referral Type:   Consultation     Referral Reason:   Specialty Services Required     Referred to Provider:   Ken Gomez NP     Number of Visits Requested:   1    melatonin 5 mg cap capsule     Sig: Take 5 mg by mouth nightly as needed.  onabotulinumtoxinA (Botox) 200 unit injection     Sig: Inject 155 units into 31 FDA indicated and approved sites in the head, face, neck every 3 months for chronic migraine. Dispense:  200 Units     Refill:  5    busPIRone (BUSPAR) 5 mg tablet     Sig: Take 1-2 tablets by mouth BID PRN     Dispense:  60 Tablet     Refill:  5       1. Chronic migraine with aura        Stop Vyepti   Has failed this, Pura Robbins. Also failed Topamax, Inderal, Elavil  Start BOTOX for chronic migraine preventative. Has failed multiple others as seen above. Will start this and will see significant benefit. Keep Zembrace vs. Toradol for PRN rescue for now   Use Buspar for PRN anxiety, Xanax too sedating.              This note will not be viewable in [employer]

## 2022-02-18 ENCOUNTER — TELEPHONE (OUTPATIENT)
Dept: NEUROLOGY | Age: 47
End: 2022-02-18

## 2022-02-18 NOTE — TELEPHONE ENCOUNTER
Re: botox    Rcvd PA request from nurse, created case in [facility] Key# [id number]    Submitted and awaiting update. Will review cpt 54467 once PA is responded to.

## 2022-02-26 DIAGNOSIS — G43.119 MIGRAINE WITH AURA, INTRACTABLE, WITHOUT STATUS MIGRAINOSUS: ICD-10-CM

## 2022-02-28 ENCOUNTER — PATIENT MESSAGE (OUTPATIENT)
Dept: NEUROLOGY | Age: 47
End: 2022-02-28

## 2022-02-28 RX ORDER — SUMATRIPTAN AND NAPROXEN SODIUM 85; 500 MG/1; MG/1
TABLET, FILM COATED ORAL
Qty: 9 TABLET | Refills: 4 | Status: SHIPPED | OUTPATIENT
Start: 2022-02-28 | End: 2022-07-06

## 2022-03-08 RX ORDER — ONABOTULINUMTOXINA 200 [USP'U]/1
INJECTION, POWDER, LYOPHILIZED, FOR SOLUTION INTRADERMAL; INTRAMUSCULAR
Qty: 200 UNITS | Refills: 5 | Status: SHIPPED | OUTPATIENT
Start: 2022-03-08

## 2022-03-10 ENCOUNTER — PATIENT MESSAGE (OUTPATIENT)
Dept: NEUROLOGY | Age: 47
End: 2022-03-10

## 2022-03-18 ENCOUNTER — PATIENT MESSAGE (OUTPATIENT)
Dept: NEUROLOGY | Age: 47
End: 2022-03-18

## 2022-03-18 PROBLEM — G43.119 MIGRAINE WITH AURA, INTRACTABLE, WITHOUT STATUS MIGRAINOSUS: Status: ACTIVE | Noted: 2017-08-11

## 2022-03-19 PROBLEM — R42 DIZZY: Status: ACTIVE | Noted: 2017-08-11

## 2022-03-21 ENCOUNTER — OFFICE VISIT (OUTPATIENT)
Dept: NEUROLOGY | Age: 47
End: 2022-03-21
Payer: COMMERCIAL

## 2022-03-21 DIAGNOSIS — G43.109 CHRONIC MIGRAINE WITH AURA: Primary | ICD-10-CM

## 2022-03-21 PROCEDURE — 64615 CHEMODENERV MUSC MIGRAINE: CPT | Performed by: NURSE PRACTITIONER

## 2022-03-21 RX ORDER — ONABOTULINUMTOXINA 200 [USP'U]/1
INJECTION, POWDER, LYOPHILIZED, FOR SOLUTION INTRADERMAL; INTRAMUSCULAR
Qty: 155 UNITS | Refills: 0 | Status: SHIPPED | COMMUNITY
Start: 2022-03-21

## 2022-03-21 NOTE — PROGRESS NOTES
[facility]  OFFICE PROCEDURE PROGRESS NOTE        Chart reviewed for the following:   I, [id number] Ralph Ram NP, have reviewed the History, Physical and updated the Allergic reactions for [address] performed immediately prior to start of procedure:   Jack HANNAH NP, have performed the following reviews on Casper Amado prior to the start of the procedure:            * Patient was identified by name and date of birth   * Agreement on procedure being performed was verified  * Risks and Benefits explained to the patient  * Procedure site verified and marked as necessary  * Patient was positioned for comfort  * Consent was signed and verified     Time: 1500      Date of procedure: 3/21/2022    Procedure performed by:  Devora Flores NP    Provider assisted by: None    Patient assisted by: None    How tolerated by patient: tolerated the procedure well with no complications    Post Procedural Pain Scale: 2 - Hurts Little Bit    Comments: None    Botox Injection Note       Indication: patient has chronic recurrent migraine, has greater than 15 migraine headaches per month, has failed two or more categories of preventatives    Procedure:   Botox concentration: 200 units in 4 ml of preservative-free normal saline. 31 sites injections, distribution as follow      Units/site  Sites Sides Subtotal    Procerus 5 1 1 5    5 1 2 10   Frontalis 5 2 2 20   Temporalis 5 4 2 40   Occipitalis 5 3 2 30   Upper cervical paraspinalis 5 2 2 20   Trapezius 5 3 2 30         200 units Botox were reconstituted, 155 units injected as above and the remainder was unavoidably wasted.      Patient tolerated procedure well.     ________________________  Enriqueta Prabhakar

## 2022-03-26 NOTE — TELEPHONE ENCOUNTER
Re; botox    Follow yp and see  is approved.  Added pt to availity to review cpt [phone]    Created [id number], [facility] update, ssp is [facility]

## 2022-05-24 ENCOUNTER — TELEPHONE (OUTPATIENT)
Dept: NEUROLOGY | Age: 47
End: 2022-05-24

## 2022-05-24 ENCOUNTER — OFFICE VISIT (OUTPATIENT)
Dept: NEUROLOGY | Age: 47
End: 2022-05-24
Payer: COMMERCIAL

## 2022-05-24 VITALS
SYSTOLIC BLOOD PRESSURE: 132 MMHG | OXYGEN SATURATION: 99 % | HEART RATE: 80 BPM | DIASTOLIC BLOOD PRESSURE: 76 MMHG | TEMPERATURE: 97.4 F

## 2022-05-24 DIAGNOSIS — G43.909 MIGRAINE WITHOUT STATUS MIGRAINOSUS, NOT INTRACTABLE, UNSPECIFIED MIGRAINE TYPE: Primary | ICD-10-CM

## 2022-05-24 PROCEDURE — 99214 OFFICE O/P EST MOD 30 MIN: CPT | Performed by: NURSE PRACTITIONER

## 2022-05-24 NOTE — PROGRESS NOTES
Brandon Alcala is a 42 y.o. female who presents with the following  Chief Complaint   Patient presents with    Migraine       HPI     FU Botox # 1   Few weeks after procedure she had some numbness in the forehead and still feels like this but not as much   She did have some jaw pain and this did go away in a few weeks but we are unsure as to if this cause was Botox. Will watch with next procedure  She has noticed a drop in migraine frequent. She has also noticed they are not as painful   She has not needed the Toradol since the Botox   She uses Treximet usually   Can use Zembrace if needed but has not needed this much either. 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 No Known Allergies    Current Outpatient Medications   Medication Sig    onabotulinumtoxinA (Botox) 200 unit injection Inject 155 units into 31 FDA indicated and approved sites in the head, face, neck every 3 months for chronic migraine.  SUMAtriptan-naproxen (Treximet)  mg tablet TAKE 1 TABLET AT ONSET OF HEADACHE, MAY REPEAT 1 DOSE IN 2 HOUR, MAX DOSE OF 2 TABLETS IN 24 HOURS    melatonin 5 mg cap capsule Take 5 mg by mouth nightly as needed.  busPIRone (BUSPAR) 5 mg tablet Take 1-2 tablets by mouth BID PRN    ketorolac (TORADOL) 30 mg/mL (1 mL) injection INJECT 1 MILLILITER INTRAMUSCULARLY AT HEADACHE ONSET AS NEEDED EVERY 8 HOURS UP TO 3 DAYS A WEEK    omega-3 acid ethyl esters (LOVAZA) 1 gram capsule Take 1 g by mouth daily (with breakfast).  calcium-cholecalciferol, d3, (CALCIUM 600 + D) 600-125 mg-unit tab Take 1 Tablet by mouth daily.  co-enzyme Q-10 (Co Q-10) 100 mg capsule Take 100 mg by mouth daily.  B.infantis-B.ani-B.long-B.bifi (Probiotic 4X) 10-15 mg TbEC Take 1 Capsule by mouth daily.     SUMAtriptan succinate (Zembrace Symtouch) 3 mg/0.5 mL pnij Inject 1 syringe IM at HA onset. May repeat X 1 dose every hour up to 4 doses. Max 2 doses in 24 hours.  Syringe with Needle, Safety 1 mL 27 gauge x 1/2\" syrg Use 1 syringe as needed for IM injection    levonorgestrel (MIRENA) 20 mcg/24 hr (5 years) IUD 1 Device by IntraUTERine route once.  ARMOUR THYROID 30 mg tablet Take 105 mcg by mouth daily.  multivitamin (ONE A DAY) tablet Take 1 Tab by mouth daily.  CHOLECALCIFEROL, VITAMIN D3, (VITAMIN D3 PO) Take  by mouth.  FERROUS FUMARATE/VIT BCOMP,C (SUPER B COMPLEX PO) Take  by mouth.  onabotulinumtoxinA (Botox) 200 unit injection INJECT 155 UNITS IM TO 31 FDA APPROVED SITES EVERY 12 WEEKS FOR CHRONIC MIGRAINE (Patient not taking: Reported on 5/24/2022)     No current facility-administered medications for this visit. Social History     Tobacco Use   Smoking Status Never Smoker   Smokeless Tobacco Never Used       Past Medical History:   Diagnosis Date    Anxiety     Fatigue     Headache     Nausea     Ringing in ears     Thyroid disease     Vertigo     Visual disturbance        Past Surgical History:   Procedure Laterality Date    HX HEENT      right thyroid removal       Family History   Problem Relation Age of Onset    Headache Father     Heart Disease Father     Neuropathy Father     Stroke Father     Diabetes Father     Dementia Maternal Grandmother     Cancer Paternal Grandfather        Social History     Socioeconomic History    Marital status:    Tobacco Use    Smoking status: Never Smoker    Smokeless tobacco: Never Used   Substance and Sexual Activity    Alcohol use: No       Review of Systems   Eyes: Positive for blurred vision and photophobia. Negative for double vision. Gastrointestinal: Negative for abdominal pain, nausea and vomiting. Neurological: Positive for headaches. Negative for dizziness, seizures and loss of consciousness. Remainder of comprehensive review is negative. Physical Exam :    Visit Vitals  /76   Pulse 80   Temp 97.4 °F (36.3 °C)   SpO2 99%       General: Well defined, nourished, and groomed individual in no acute distress.    Neck: Supple, nontender, no bruits, no pain with resistance to active range of motion.    Musculoskeletal: Extremities revealed no edema and had full range of motion of joints.    Psych: Good mood and bright affect    NEUROLOGICAL EXAMINATION:    Mental Status: Alert and oriented to person, place, and time    Cranial Nerves:    II, III, IV, VI: Visual acuity grossly intact. Visual fields are normal.    Pupils are equal, round, and reactive to light and accommodation.    Extra-ocular movements are full and fluid. Fundoscopic exam was benign, no ptosis or nystagmus.    V-XII: Hearing is grossly intact. Facial features are symmetric, with normal sensation and strength. The palate rises symmetrically and the tongue protrudes midline. Sternocleidomastoids 5/5. Motor Examination: Normal tone, bulk, and strength, 5/5 muscle strength throughout. Coordination: Finger to nose was normal. No resting or intention tremor    Gait and Station: Steady while walking. Normal arm swing. No pronator drift. No muscle wasting or fasiculations noted. Reflexes: DTRs 2+ throughout. Results for orders placed or performed during the hospital encounter of 03/07/20   URINE CULTURE HOLD SAMPLE    Specimen: Serum   Result Value Ref Range    Urine culture hold        Urine on hold in Microbiology dept for 2 days. If unpreserved urine is submitted, it cannot be used for addtional testing after 24 hours, recollection will be required.    CBC WITH AUTOMATED DIFF   Result Value Ref Range    WBC 6.4 3.6 - 11.0 K/uL    RBC 5.12 3.80 - 5.20 M/uL    HGB 15.1 11.5 - 16.0 g/dL    HCT 46.4 35.0 - 47.0 %    MCV 90.6 80.0 - 99.0 FL    MCH 29.5 26.0 - 34.0 PG    MCHC 32.5 30.0 - 36.5 g/dL    RDW 12.9 11.5 - 14.5 %    PLATELET 307 783 - 123 K/uL    MPV 9.6 8.9 - 12.9 FL    NRBC 0.0 0  WBC    ABSOLUTE NRBC 0.00 0.00 - 0.01 K/uL    NEUTROPHILS 66 32 - 75 %    LYMPHOCYTES 23 12 - 49 %    MONOCYTES 8 5 - 13 %    EOSINOPHILS 2 0 - 7 %    BASOPHILS 1 0 - 1 %    IMMATURE GRANULOCYTES 0 0.0 - 0.5 %    ABS. NEUTROPHILS 4.2 1.8 - 8.0 K/UL    ABS. LYMPHOCYTES 1.5 0.8 - 3.5 K/UL    ABS. MONOCYTES 0.5 0.0 - 1.0 K/UL    ABS. EOSINOPHILS 0.1 0.0 - 0.4 K/UL    ABS. BASOPHILS 0.1 0.0 - 0.1 K/UL    ABS. IMM. GRANS. 0.0 0.00 - 0.04 K/UL    DF AUTOMATED     METABOLIC PANEL, COMPREHENSIVE   Result Value Ref Range    Sodium 140 136 - 145 mmol/L    Potassium 4.0 3.5 - 5.1 mmol/L    Chloride 105 97 - 108 mmol/L    CO2 29 21 - 32 mmol/L    Anion gap 6 5 - 15 mmol/L    Glucose 84 65 - 100 mg/dL    BUN 16 6 - 20 MG/DL    Creatinine 0.76 0.55 - 1.02 MG/DL    BUN/Creatinine ratio 21 (H) 12 - 20      GFR est AA >60 >60 ml/min/1.73m2    GFR est non-AA >60 >60 ml/min/1.73m2    Calcium 9.3 8.5 - 10.1 MG/DL    Bilirubin, total 0.6 0.2 - 1.0 MG/DL    ALT (SGPT) 27 12 - 78 U/L    AST (SGOT) 13 (L) 15 - 37 U/L    Alk.  phosphatase 82 45 - 117 U/L    Protein, total 7.7 6.4 - 8.2 g/dL    Albumin 4.4 3.5 - 5.0 g/dL    Globulin 3.3 2.0 - 4.0 g/dL    A-G Ratio 1.3 1.1 - 2.2     URINALYSIS W/MICROSCOPIC   Result Value Ref Range    Color YELLOW/STRAW      Appearance TURBID (A) CLEAR      Specific gravity 1.017 1.003 - 1.030      pH (UA) 8.0 5.0 - 8.0      Protein NEGATIVE  NEG mg/dL    Glucose NEGATIVE  NEG mg/dL    Ketone NEGATIVE  NEG mg/dL    Bilirubin NEGATIVE  NEG      Blood NEGATIVE  NEG      Urobilinogen 0.2 0.2 - 1.0 EU/dL    Nitrites NEGATIVE  NEG      Leukocyte Esterase NEGATIVE  NEG      WBC 0-4 0 - 4 /hpf    RBC 0-5 0 - 5 /hpf    Epithelial cells FEW FEW /lpf    Bacteria 1+ (A) NEG /hpf    Amorphous Crystals 1+ (A) NEG   SAMPLES BEING HELD   Result Value Ref Range    SAMPLES BEING HELD 1SST     COMMENT        Add-on orders for these samples will be processed based on acceptable specimen integrity and analyte stability, which may vary by analyte. HCG URINE, QL. - POC   Result Value Ref Range    Pregnancy test,urine (POC) NEGATIVE  NEG         No orders of the defined types were placed in this encounter. 1. Migraine without status migrainosus, not intractable, unspecified migraine type        Botox post number 1 positive changes.    She has had less migraine and not as painful   Had some potential post injection site reactions we will watch next time   Keep Treximet vs. Jerl Nguyễn for PRN rescue  Has not needed Toradol                 This note will not be viewable in [employer]

## 2022-06-01 ENCOUNTER — TELEPHONE (OUTPATIENT)
Dept: NEUROLOGY | Age: 47
End: 2022-06-01

## 2022-06-01 NOTE — TELEPHONE ENCOUNTER
Spoke with [facility] [place] and arranged delivery of Botox for the week prior to injection. They do need permission from patient to ship to office. Sent portal message for patient to call pharmacy.

## 2022-06-21 ENCOUNTER — TELEPHONE (OUTPATIENT)
Dept: NEUROLOGY | Age: 47
End: 2022-06-21

## 2022-06-21 ENCOUNTER — OFFICE VISIT (OUTPATIENT)
Dept: NEUROLOGY | Age: 47
End: 2022-06-21
Payer: COMMERCIAL

## 2022-06-21 DIAGNOSIS — G43.909 MIGRAINE WITHOUT STATUS MIGRAINOSUS, NOT INTRACTABLE, UNSPECIFIED MIGRAINE TYPE: Primary | ICD-10-CM

## 2022-06-21 PROCEDURE — 64615 CHEMODENERV MUSC MIGRAINE: CPT | Performed by: NURSE PRACTITIONER

## 2022-06-21 NOTE — PROGRESS NOTES
[facility]  OFFICE PROCEDURE PROGRESS NOTE        Chart reviewed for the following:   BRIELLE [de-identified] M GENI Ram, have reviewed the History, Physical and updated the Allergic reactions for [address] performed immediately prior to start of procedure:   Jack HANNAH NP, have performed the following reviews on Luis Arroyo prior to the start of the procedure:            * Patient was identified by name and date of birth   * Agreement on procedure being performed was verified  * Risks and Benefits explained to the patient  * Procedure site verified and marked as necessary  * Patient was positioned for comfort  * Consent was signed and verified     Time: 1500      Date of procedure: 6/21/2022    Procedure performed by:  Shiv Vang NP    Provider assisted by: None    Patient assisted by: None    How tolerated by patient: tolerated the procedure well with no complications    Post Procedural Pain Scale: 2 - Hurts Little Bit    Comments: None      Botox Injection Note       Indication: patient has chronic recurrent migraine, has 7-10 less migraine days per month with botox injections    Procedure:   Botox concentration: 200 units in 4 ml of preservative-free normal saline. 31 sites injections, distribution as follow      Units/site  Sites Sides Subtotal    Procerus 5 1 1 5    0 0 0 0   Frontalis 5 2 2 20   Temporalis 5 4 2 40   Occipitalis 5 3 2 30   Upper cervical paraspinalis 5 2 2 20   Trapezius 5 3 2 30         200 units Botox were reconstituted, 165 units injected as above and the remainder was unavoidably wasted.      Patient tolerated procedure well.     _____________________________   [facility]

## 2022-06-30 NOTE — TELEPHONE ENCOUNTER
Re: Botox    No update trent, called  at [phone] and spoke with agent Saad Zavala and she advised PA request was approved from 03/21/22-09/16/22 and she is faxing approval letter to me. Called local [facility] for processing and they set reminder to call us in sept.

## 2022-07-02 DIAGNOSIS — G43.119 MIGRAINE WITH AURA, INTRACTABLE, WITHOUT STATUS MIGRAINOSUS: ICD-10-CM

## 2022-07-06 RX ORDER — SUMATRIPTAN AND NAPROXEN SODIUM 85; 500 MG/1; MG/1
TABLET, FILM COATED ORAL
Qty: 9 TABLET | Refills: 4 | Status: SHIPPED | OUTPATIENT
Start: 2022-07-06 | End: 2022-10-31

## 2022-09-06 ENCOUNTER — TELEPHONE (OUTPATIENT)
Dept: NEUROLOGY | Age: 47
End: 2022-09-06

## 2022-09-06 NOTE — TELEPHONE ENCOUNTER
Called and spoke with Janeth Pagan 894 @ [facility] [phone]. Botox scheduled to deliver 9/8/2022. They are calling patient today for consent to ship.

## 2022-09-20 ENCOUNTER — OFFICE VISIT (OUTPATIENT)
Dept: NEUROLOGY | Age: 47
End: 2022-09-20
Payer: COMMERCIAL

## 2022-09-20 DIAGNOSIS — G43.119 MIGRAINE WITH AURA, INTRACTABLE, WITHOUT STATUS MIGRAINOSUS: Primary | ICD-10-CM

## 2022-09-20 PROCEDURE — 64615 CHEMODENERV MUSC MIGRAINE: CPT | Performed by: NURSE PRACTITIONER

## 2022-09-20 RX ORDER — ONABOTULINUMTOXINA 200 [USP'U]/1
INJECTION, POWDER, LYOPHILIZED, FOR SOLUTION INTRADERMAL; INTRAMUSCULAR
Qty: 200 UNITS | Refills: 5 | Status: CANCELLED | COMMUNITY
Start: 2022-09-20

## 2022-09-20 NOTE — PROGRESS NOTES
[facility]  OFFICE PROCEDURE PROGRESS NOTE        Chart reviewed for the following:   I, [de-identified] M GENI Ram, have reviewed the History, Physical and updated the Allergic reactions for [address] performed immediately prior to start of procedure:   I, [de-identified] M GENI Ram, have performed the following reviews on Patricia Fall prior to the start of the procedure:            * Patient was identified by name and date of birth   * Agreement on procedure being performed was verified  * Risks and Benefits explained to the patient  * Procedure site verified and marked as necessary  * Patient was positioned for comfort  * Consent was signed and verified     Time: 0820      Date of procedure: 9/20/2022    Procedure performed by:  Guerda Ghotra NP    Provider assisted by: None    Patient assisted by: None    How tolerated by patient: tolerated the procedure well with no complications    Post Procedural Pain Scale: 2 - Hurts Little Bit    Comments: None      Botox Injection Note       Indication: patient has chronic recurrent migraine, has 7-10 less migraine days per month with botox injections    Procedure:   Botox concentration: 200 units in 4 ml of preservative-free normal saline. 31 sites injections, distribution as follow      Units/site  Sites Sides Subtotal    Procerus 5 1 1 5    5 1 2 10   Frontalis 5 2 2 20   Temporalis 5 4 2 40   Occipitalis 5 3 2 30   Upper cervical paraspinalis 5 2 2 20   Trapezius 5 3 2 30         200 units Botox were reconstituted, 155 units injected as above and the remainder was unavoidably wasted.      Patient tolerated procedure well.     _____________________________   Zuleyka Ely

## 2022-09-20 NOTE — PROGRESS NOTES
Chief Complaint   Patient presents with    Injection     Botox     Consent signed. Patient states she has had 6-8 migraines in the last 30 days.      Botox 200units   Lot #[id number]  Expires 2/28/2025   -Phoebe Frazier

## 2022-10-31 DIAGNOSIS — G43.119 MIGRAINE WITH AURA, INTRACTABLE, WITHOUT STATUS MIGRAINOSUS: ICD-10-CM

## 2022-10-31 RX ORDER — SUMATRIPTAN AND NAPROXEN SODIUM 85; 500 MG/1; MG/1
TABLET, FILM COATED ORAL
Qty: 9 TABLET | Refills: 4 | Status: SHIPPED | OUTPATIENT
Start: 2022-10-31

## 2022-12-01 ENCOUNTER — TELEPHONE (OUTPATIENT)
Dept: NEUROLOGY | Age: 47
End: 2022-12-01

## 2022-12-05 ENCOUNTER — TELEPHONE (OUTPATIENT)
Dept: NEUROLOGY | Age: 47
End: 2022-12-05

## 2022-12-20 ENCOUNTER — OFFICE VISIT (OUTPATIENT)
Dept: NEUROLOGY | Age: 47
End: 2022-12-20
Payer: COMMERCIAL

## 2022-12-20 ENCOUNTER — DOCUMENTATION ONLY (OUTPATIENT)
Dept: NEUROLOGY | Age: 47
End: 2022-12-20

## 2022-12-20 DIAGNOSIS — G43.109 CHRONIC MIGRAINE WITH AURA: Primary | ICD-10-CM

## 2022-12-20 PROCEDURE — 64615 CHEMODENERV MUSC MIGRAINE: CPT | Performed by: NURSE PRACTITIONER

## 2022-12-20 RX ORDER — SUMATRIPTAN 3 MG/1
INJECTION, SOLUTION SUBCUTANEOUS
Qty: 8 ML | Refills: 5 | Status: SHIPPED | OUTPATIENT
Start: 2022-12-20

## 2022-12-20 NOTE — PROGRESS NOTES
[facility]  OFFICE PROCEDURE PROGRESS NOTE        Chart reviewed for the following:   BRIELLE [de-identified] M GENI Ram, have reviewed the History, Physical and updated the Allergic reactions for [address] performed immediately prior to start of procedure:   Jack HANNAH NP, have performed the following reviews on [facility] prior to the start of the procedure:            * Patient was identified by name and date of birth   * Agreement on procedure being performed was verified  * Risks and Benefits explained to the patient  * Procedure site verified and marked as necessary  * Patient was positioned for comfort  * Consent was signed and verified     Time: 0840      Date of procedure: 12/20/2022    Procedure performed by:  Lily Hammer NP    Provider assisted by: None    Patient assisted by: None    How tolerated by patient: tolerated the procedure well with no complications    Post Procedural Pain Scale: 2 - Hurts Little Bit    Comments: None      Botox Injection Note       Indication: patient has chronic recurrent migraine, has 7-10 less migraine days per month with botox injections    Procedure:   Botox concentration: 200 units in 4 ml of preservative-free normal saline. 31 sites injections, distribution as follow      Units/site  Sites Sides Subtotal    Procerus 5 1 1 5    5 1 2 10   Frontalis 5 2 2 20   Temporalis 5 4 2 40   Occipitalis 5 3 2 30   Upper cervical paraspinalis 5 2 2 20   Trapezius 5 3 2 30         200 units Botox were reconstituted, 155 units injected as above and the remainder was unavoidably wasted.      Patient tolerated procedure well.     _____________________________   Harrison Francisco

## 2023-02-06 RX ORDER — KETOROLAC TROMETHAMINE 30 MG/ML
INJECTION, SOLUTION INTRAMUSCULAR; INTRAVENOUS
Qty: 4 ML | Refills: 5
Start: 2023-02-06

## 2023-02-10 RX ORDER — PREDNISONE 10 MG/1
TABLET ORAL
Qty: 42 TABLET | Refills: 0 | Status: SHIPPED | OUTPATIENT
Start: 2023-02-10

## 2023-02-10 RX ORDER — PREDNISONE 10 MG/1
TABLET ORAL
Qty: 42 TABLET | Refills: 0 | Status: SHIPPED | OUTPATIENT
Start: 2023-02-10 | End: 2023-02-10 | Stop reason: SDUPTHER

## 2023-02-13 RX ORDER — KETOROLAC TROMETHAMINE 30 MG/ML
INJECTION, SOLUTION INTRAMUSCULAR; INTRAVENOUS
Qty: 4 ML | Refills: 5 | Status: SHIPPED | OUTPATIENT
Start: 2023-02-13

## 2023-02-28 DIAGNOSIS — G43.119 MIGRAINE WITH AURA, INTRACTABLE, WITHOUT STATUS MIGRAINOSUS: ICD-10-CM

## 2023-02-28 RX ORDER — SUMATRIPTAN AND NAPROXEN SODIUM 85; 500 MG/1; MG/1
TABLET, FILM COATED ORAL
Qty: 9 TABLET | Refills: 4 | Status: SHIPPED | OUTPATIENT
Start: 2023-02-28

## 2023-03-01 ENCOUNTER — TELEPHONE (OUTPATIENT)
Dept: NEUROLOGY | Age: 48
End: 2023-03-01

## 2023-03-01 NOTE — TELEPHONE ENCOUNTER
Ketorolac 30 mg- Key: [id number]    PA Case ID: [id number]  03/01/2023 to 03/31/2023  Sent to [facility]    FYI to Gamal

## 2023-03-02 RX ORDER — ONABOTULINUMTOXINA 200 [USP'U]/1
INJECTION, POWDER, LYOPHILIZED, FOR SOLUTION INTRADERMAL; INTRAMUSCULAR
Qty: 1 EACH | Refills: 3 | Status: SHIPPED | OUTPATIENT
Start: 2023-03-02

## 2023-03-16 ENCOUNTER — TELEPHONE (OUTPATIENT)
Dept: NEUROLOGY | Age: 48
End: 2023-03-16

## 2023-03-24 ENCOUNTER — TELEPHONE (OUTPATIENT)
Dept: NEUROLOGY | Age: 48
End: 2023-03-24

## 2023-03-24 NOTE — TELEPHONE ENCOUNTER
The pharmacy is calling to set up delivery for Botox.     Please contact negative Alert & oriented; no sensory, motor or coordination deficits, normal reflexes

## 2023-04-04 ENCOUNTER — OFFICE VISIT (OUTPATIENT)
Dept: NEUROLOGY | Age: 48
End: 2023-04-04
Payer: COMMERCIAL

## 2023-04-04 PROCEDURE — 64615 CHEMODENERV MUSC MIGRAINE: CPT | Performed by: NURSE PRACTITIONER

## 2023-04-04 RX ORDER — NORTRIPTYLINE HYDROCHLORIDE 10 MG/1
10 CAPSULE ORAL
Qty: 90 CAPSULE | Refills: 1 | Status: SHIPPED
Start: 2023-04-04

## 2023-04-04 NOTE — PROGRESS NOTES
[facility]  OFFICE PROCEDURE PROGRESS NOTE        Chart reviewed for the following:   I, [de-identified] M GENI Ram, have reviewed the History, Physical and updated the Allergic reactions for [address] performed immediately prior to start of procedure:   I, [de-identified] M GENI Ram, have performed the following reviews on Crystal May prior to the start of the procedure:            * Patient was identified by name and date of birth   * Agreement on procedure being performed was verified  * Risks and Benefits explained to the patient  * Procedure site verified and marked as necessary  * Patient was positioned for comfort  * Consent was signed and verified     Time: 0920      Date of procedure: 4/4/2023    Procedure performed by:  Mike Mackey NP    Provider assisted by: None    Patient assisted by: None    How tolerated by patient: tolerated the procedure well with no complications    Post Procedural Pain Scale: 2 - Hurts Little Bit    Comments: None    [id number]  Ex: 09/2025    Botox Injection Note       Indication: patient has chronic recurrent migraine, has 7-10 less migraine days per month with botox injections    Procedure:   Botox concentration: 200 units in 4 ml of preservative-free normal saline. 31 sites injections, distribution as follow      Units/site  Sites Sides Subtotal    Procerus 5 1 1 5    5 1 2 10   Frontalis 5 2 2 20   Temporalis 5 4 2 40   Occipitalis 5 3 2 30   Upper cervical paraspinalis 5 2 2 20   Trapezius 5 3 2 30         200 units Botox were reconstituted, 155 units injected as above and the remainder was unavoidably wasted.      Patient tolerated procedure well.     _____________________________   Jayna covington

## 2023-04-27 RX ORDER — SUMATRIPTAN 3 MG/1
INJECTION, SOLUTION SUBCUTANEOUS
Qty: 8 ML | Refills: 5 | Status: SHIPPED | OUTPATIENT
Start: 2023-04-27

## 2023-06-19 ENCOUNTER — OFFICE VISIT (OUTPATIENT)
Age: 48
End: 2023-06-19
Payer: COMMERCIAL

## 2023-06-19 VITALS
HEIGHT: 68 IN | DIASTOLIC BLOOD PRESSURE: 78 MMHG | SYSTOLIC BLOOD PRESSURE: 110 MMHG | HEART RATE: 68 BPM | RESPIRATION RATE: 18 BRPM | WEIGHT: 197 LBS | OXYGEN SATURATION: 98 % | BODY MASS INDEX: 29.86 KG/M2

## 2023-06-19 DIAGNOSIS — G43.711 CHRONIC MIGRAINE WITHOUT AURA, INTRACTABLE, WITH STATUS MIGRAINOSUS: ICD-10-CM

## 2023-06-19 DIAGNOSIS — G43.109 MIGRAINE WITH AURA, NOT INTRACTABLE, WITHOUT STATUS MIGRAINOSUS: Primary | ICD-10-CM

## 2023-06-19 PROCEDURE — 99214 OFFICE O/P EST MOD 30 MIN: CPT | Performed by: NURSE PRACTITIONER

## 2023-06-19 RX ORDER — CETIRIZINE HYDROCHLORIDE 10 MG/1
CAPSULE, LIQUID FILLED ORAL
COMMUNITY

## 2023-06-19 RX ORDER — ALPRAZOLAM 0.25 MG/1
0.25 TABLET ORAL 3 TIMES DAILY PRN
Qty: 30 TABLET | Refills: 0 | Status: SHIPPED | OUTPATIENT
Start: 2023-06-19 | End: 2023-07-19

## 2023-06-19 RX ORDER — ESTRADIOL 0.05 MG/D
PATCH, EXTENDED RELEASE TRANSDERMAL
COMMUNITY

## 2023-06-19 RX ORDER — ZONISAMIDE 50 MG/1
50 CAPSULE ORAL
Qty: 90 CAPSULE | Refills: 1 | Status: SHIPPED | OUTPATIENT
Start: 2023-06-19

## 2023-06-19 ASSESSMENT — PATIENT HEALTH QUESTIONNAIRE - PHQ9
1. LITTLE INTEREST OR PLEASURE IN DOING THINGS: 0
SUM OF ALL RESPONSES TO PHQ QUESTIONS 1-9: 0
2. FEELING DOWN, DEPRESSED OR HOPELESS: 0
SUM OF ALL RESPONSES TO PHQ9 QUESTIONS 1 & 2: 0

## 2023-06-19 NOTE — PROGRESS NOTES
Chief Complaint   Patient presents with    Follow-up     Since last visit she has been having 8-9 migraines average         /78 (Site: Right Upper Arm, Position: Sitting, Cuff Size: Medium Adult)   Pulse 68   Resp 18   Ht 5' 8\" (1.727 m)   Wt 197 lb (89.4 kg)   SpO2 98%   BMI 29.95 kg/m²      1. Have you been to the ER, urgent care clinic since your last visit? Hospitalized since your last visit? No    2. Have you seen or consulted any other health care providers outside of the [address] [place] [address] since your last visit? Include any pap smears or colon screening.  No

## 2023-06-20 NOTE — PROGRESS NOTES
Rodrigue Hazel is a 41 y.o. female who presents with the following  Chief Complaint   Patient presents with    Follow-up     Since last visit she has been having 8-9 migraines average        HPI    Follow-up for migraines  She is currently on Botox and having about 8-9 migraines a month  She was at chronic, 20 or more a month. Lasting 6 hours or longer before starting botox. She tried nortriptyline last visit and this caused her to feel bad and have increased anxiety so she stopped  She is currently managing well on the Botox every 3 months  She has Imitrex injections which worked really quick and fast if she needs them  She does have Treximet for the not as intense migraines  She feels like things are maintaining well  She has never used Zonegran  We discussed this and we will try this at 50 mg nightly  Her migraines have not had any other new symptoms  She does feel content with where the Botox has her but does want more relief if we can      No Known Allergies    Current Outpatient Medications   Medication Sig Dispense Refill    estradiol (VIVELLE) 0.05 MG/24HR 1 patch to skin Transdermal Two times a Week for 30 day(s)      Cetirizine HCl (ZYRTEC ALLERGY) 10 MG CAPS 1 tab(s) orally once a day      VITAMIN D PO 1 TAB BY MOUTH ONCE A DAY      B Complex-Biotin-FA (SUPER B-COMPLEX PO) 1 tab(s) orally once a day      Probiotic Product (PROBIOTIC PO) 1 TAB QD      Multiple Vitamin (MULTIVITAMIN ADULT PO) Take 1 tablet by mouth daily      Magnesium 400 MG CAPS 1 TABLET BID      zonisamide (ZONEGRAN) 50 MG capsule Take 1 capsule by mouth nightly 90 capsule 1    Onabotulinumtoxin A (BOTOX) 200 units injection Inject 155 units into 31 FDA approved sites in head, face, neck, every 3 months for chronic migraine. 1 each 3    ALPRAZolam (XANAX) 0.25 MG tablet Take 1 tablet by mouth 3 times daily as needed for Anxiety for up to 30 days. Max Daily Amount: 0.75 mg 30 tablet 0    Calcium Carbonate-Vitamin D 600-3. [place]

## 2023-06-29 ENCOUNTER — TELEPHONE (OUTPATIENT)
Age: 48
End: 2023-06-29

## 2023-06-29 RX ORDER — SUMATRIPTAN SUCC/NAPROXEN SOD 85MG-500MG
TABLET ORAL
Qty: 9 TABLET | Refills: 4 | Status: SHIPPED | OUTPATIENT
Start: 2023-06-29

## 2023-06-30 ENCOUNTER — TELEPHONE (OUTPATIENT)
Age: 48
End: 2023-06-30

## 2023-07-05 ENCOUNTER — TELEPHONE (OUTPATIENT)
Age: 48
End: 2023-07-05

## 2023-07-07 ENCOUNTER — CLINICAL DOCUMENTATION (OUTPATIENT)
Age: 48
End: 2023-07-07

## 2023-07-07 NOTE — PROGRESS NOTES
BOTOX DELIVERED FROM- [facility], [facility] ([facility])  ADDEM:[phone]  RX: [id number]  ( 2 ) REFILLS REMAIN

## 2023-08-01 ENCOUNTER — TELEPHONE (OUTPATIENT)
Age: 48
End: 2023-08-01

## 2023-08-04 ENCOUNTER — TELEPHONE (OUTPATIENT)
Age: 48
End: 2023-08-04

## 2023-08-04 NOTE — TELEPHONE ENCOUNTER
Patient requesting to reschedule botox treatment. Patient stated she would like to know if  there is availability for?     August 16-anything after 1pm  August 18- any time  August 23- Afternoon August 24-Afternoon August 30-Afternoon August 31-Afternoon     Please contact

## 2023-08-14 ENCOUNTER — TELEPHONE (OUTPATIENT)
Age: 48
End: 2023-08-14

## 2023-08-14 NOTE — TELEPHONE ENCOUNTER
Re: Botox    Rcvd PA continuation, set reminder to e-mail [facility],[address] after term date.

## 2023-08-15 ENCOUNTER — PROCEDURE VISIT (OUTPATIENT)
Age: 48
End: 2023-08-15
Payer: COMMERCIAL

## 2023-08-15 DIAGNOSIS — G43.711 CHRONIC MIGRAINE WITHOUT AURA, INTRACTABLE, WITH STATUS MIGRAINOSUS: Primary | ICD-10-CM

## 2023-08-15 PROCEDURE — 64615 CHEMODENERV MUSC MIGRAINE: CPT | Performed by: NURSE PRACTITIONER

## 2023-08-15 RX ORDER — SYRINGE W-NEEDLE,DISPOSAB,3 ML 25GX5/8"
SYRINGE, EMPTY DISPOSABLE MISCELLANEOUS
Qty: 50 EACH | Refills: 4 | Status: SHIPPED | OUTPATIENT
Start: 2023-08-15

## 2023-08-15 NOTE — PROGRESS NOTES
OFFICE PROCEDURE PROGRESS NOTE        Chart reviewed for the following:   Azam LEACH APRN - NP, have reviewed the History, Physical and updated the Allergic reactions for [address] [address] performed immediately prior to start of procedure:   Azam LEACH APRN - NP, have performed the following reviews on Patricia Leblanc prior to the start of the procedure:            * Patient was identified by name and date of birth   * Agreement on procedure being performed was verified  * Risks and Benefits explained to the patient  * Procedure site verified and marked as necessary  * Patient was positioned for comfort  * Consent was signed and verified     Time: 1440      Date of procedure: 8/15/2023    Procedure performed by:  MAO Krueger NP    Provider assisted by: None    Patient assisted by: None    How tolerated by patient: tolerated the procedure well with no complications    Post Procedural Pain Scale: 2 - Hurts Little Bit    Comments: None    LOT: [id number]  EXP: 02/2026          Botox Injection Note       Indication: patient has chronic recurrent migraine, has 7-10 less migraine days per month with botox injections    Procedure:   Botox concentration: 200 units in 4 ml of preservative-free normal saline. 31 sites injections, distribution as follow      Units/site  Sites Sides Subtotal    Procerus 5 1 1 5    5 1 2 10   Frontalis 5 2 2 20   Temporalis 5 4 2 40   Occipitalis 5 3 2 30   Upper cervical paraspinalis 5 2 2 20   Trapezius 5 3 2 30         200 units Botox were reconstituted, 155 units injected as above and the remainder was unavoidably wasted.      Patient tolerated procedure well.     _____________________________   Mearl Pages

## 2023-10-04 ENCOUNTER — PATIENT MESSAGE (OUTPATIENT)
Age: 48
End: 2023-10-04

## 2023-10-04 DIAGNOSIS — G43.109 MIGRAINE WITH AURA, NOT INTRACTABLE, WITHOUT STATUS MIGRAINOSUS: Primary | ICD-10-CM

## 2023-10-05 RX ORDER — SUMATRIPTAN 3 MG/1
INJECTION, SOLUTION SUBCUTANEOUS
Qty: 3 ML | Refills: 5 | Status: SHIPPED | OUTPATIENT
Start: 2023-10-05

## 2023-10-05 NOTE — TELEPHONE ENCOUNTER
From: Lizzeth Rodriguez  To: Sushil Cornelius  Sent: 10/4/2023 7:14 PM EDT  Subject: Arben Jing Evening,    I received a message that I have no refills on the [facility] prescription. Can you assist? The information if below. Thank you!     [facility]  Myra Maria  Phone: [phone]  Fax: [phone]

## 2023-10-06 DIAGNOSIS — G43.109 MIGRAINE WITH AURA, NOT INTRACTABLE, WITHOUT STATUS MIGRAINOSUS: Primary | ICD-10-CM

## 2023-10-06 RX ORDER — SUMATRIPTAN AND NAPROXEN SODIUM 85; 500 MG/1; MG/1
TABLET, FILM COATED ORAL
Qty: 9 TABLET | Refills: 5 | Status: SHIPPED | OUTPATIENT
Start: 2023-10-06

## 2023-10-06 NOTE — TELEPHONE ENCOUNTER
Received Zohreh GARCIA denial letter regarding Trokendi  mg #30/30 days   Reason: patient did not meet criteria Must try two (2) certain other drugs first  Topiramate IR and one other drug from formulary list . ( list not provided )      Pablito Humphrey informed of this matter
noted
Never smoker

## 2023-10-09 ENCOUNTER — TELEPHONE (OUTPATIENT)
Age: 48
End: 2023-10-09

## 2023-10-09 DIAGNOSIS — G43.109 MIGRAINE WITH AURA, NOT INTRACTABLE, WITHOUT STATUS MIGRAINOSUS: ICD-10-CM

## 2023-10-09 NOTE — TELEPHONE ENCOUNTER
Pharmacy is calling for a script for medication Zembrace 4 ml  so patient can have 2 boxes instead of 1

## 2023-10-12 RX ORDER — SUMATRIPTAN 3 MG/1
INJECTION, SOLUTION SUBCUTANEOUS
Qty: 4 ML | Refills: 5 | Status: SHIPPED | OUTPATIENT
Start: 2023-10-12

## 2023-11-06 DIAGNOSIS — G43.109 MIGRAINE WITH AURA, NOT INTRACTABLE, WITHOUT STATUS MIGRAINOSUS: Primary | ICD-10-CM

## 2023-11-15 ENCOUNTER — TELEPHONE (OUTPATIENT)
Age: 48
End: 2023-11-15

## 2023-11-20 ENCOUNTER — TELEPHONE (OUTPATIENT)
Age: 48
End: 2023-11-20

## 2023-11-20 NOTE — TELEPHONE ENCOUNTER
Violet Lopezer - update on Aspn portal     Patient has requested to place prescription on hold. To re-start process, please have patient call [phone].

## 2023-11-21 ENCOUNTER — TELEPHONE (OUTPATIENT)
Age: 48
End: 2023-11-21

## 2023-11-27 ENCOUNTER — TELEPHONE (OUTPATIENT)
Age: 48
End: 2023-11-27

## 2023-12-04 ENCOUNTER — TELEPHONE (OUTPATIENT)
Age: 48
End: 2023-12-04

## 2023-12-04 NOTE — TELEPHONE ENCOUNTER
Drug Acquisition: BUY AND BILL  Drug: BOTOX 200 units, Dx: G43.711 (Chronic Migraine)  Insurance: [place]  Submission Type: Availity  Request Tracking ID: [id number]  Reference #: [id number]  [facility]:   CPT: 70912, no PA Required  Approval Range: 12/04/23 to 12/04/24   Scanned approval letter to media    FYI to Elaina Fernández

## 2023-12-04 NOTE — TELEPHONE ENCOUNTER
Drug Acquisition: Pharmacy  Drug: BOTOX 200 units, Dx: J70.663 (Chronic Migraine)  Insurance: [facility] Kerr:   CPT: 32369  Reference #: [id number]  Approval Range: 12/01/2023 to 11/30/24  SPP: [id number] [facility], [phone]    Scanned Letter to media.  ajc  Submitted by Davian MOORE to Ara Weber and Gina Lopez

## 2023-12-11 DIAGNOSIS — G43.711 CHRONIC MIGRAINE WITHOUT AURA, INTRACTABLE, WITH STATUS MIGRAINOSUS: Primary | ICD-10-CM

## 2023-12-21 ENCOUNTER — PROCEDURE VISIT (OUTPATIENT)
Age: 48
End: 2023-12-21
Payer: COMMERCIAL

## 2023-12-21 ENCOUNTER — HOSPITAL ENCOUNTER (OUTPATIENT)
Facility: HOSPITAL | Age: 48
Discharge: HOME | End: 2023-12-24
Attending: INTERNAL MEDICINE
Payer: COMMERCIAL

## 2023-12-21 DIAGNOSIS — G43.711 CHRONIC MIGRAINE WITHOUT AURA, INTRACTABLE, WITH STATUS MIGRAINOSUS: Primary | ICD-10-CM

## 2023-12-21 DIAGNOSIS — E04.1 NONTOXIC SINGLE THYROID NODULE: ICD-10-CM

## 2023-12-21 PROCEDURE — 76536 US EXAM OF HEAD AND NECK: CPT

## 2023-12-21 PROCEDURE — 64615 CHEMODENERV MUSC MIGRAINE: CPT | Performed by: NURSE PRACTITIONER

## 2023-12-21 RX ORDER — BUTALBITAL, ACETAMINOPHEN AND CAFFEINE 50; 325; 40 MG/1; MG/1; MG/1
1-2 TABLET ORAL EVERY 6 HOURS PRN
Qty: 60 TABLET | Refills: 4 | Status: SHIPPED | OUTPATIENT
Start: 2023-12-21

## 2023-12-28 NOTE — PROGRESS NOTES
OFFICE PROCEDURE PROGRESS NOTE        Chart reviewed for the following:   Azam LEACH APRN - NP, have reviewed the History, Physical and updated the Allergic reactions for [address] [address] performed immediately prior to start of procedure:   Azam LEACH APRN - NP, have performed the following reviews on Juancarlos Nunez prior to the start of the procedure:            * Patient was identified by name and date of birth   * Agreement on procedure being performed was verified  * Risks and Benefits explained to the patient  * Procedure site verified and marked as necessary  * Patient was positioned for comfort  * Consent was signed and verified     Time: 1400      Date of procedure: 12/21/2023    Procedure performed by:  MAO Marshall NP    Provider assisted by: None    Patient assisted by: None    How tolerated by patient: tolerated the procedure well with no complications    Post Procedural Pain Scale: 2 - Hurts Little Bit    Comments: None    NDB:[id number]  EXP: 04/2026          Botox Injection Note       Indication: patient has chronic recurrent migraine, has 7-10 less migraine days per month with botox injections    Procedure:   Botox concentration: 200 units in 4 ml of preservative-free normal saline. 31 sites injections, distribution as follow      Units/site  Sites Sides Subtotal    Procerus 5 1 1 5    5 1 2 10   Frontalis 5 2 2 20   Temporalis 5 4 2 40   Occipitalis 5 3 2 30   Upper cervical paraspinalis 5 2 2 20   Trapezius 5 3 2 30         200 units Botox were reconstituted, 155 units injected as above and the remainder was unavoidably wasted.      Patient tolerated procedure well.     _____________________________   Samy Fox

## 2024-03-21 ENCOUNTER — PROCEDURE VISIT (OUTPATIENT)
Age: 49
End: 2024-03-21
Payer: COMMERCIAL

## 2024-03-21 DIAGNOSIS — G43.711 CHRONIC MIGRAINE WITHOUT AURA, INTRACTABLE, WITH STATUS MIGRAINOSUS: Primary | ICD-10-CM

## 2024-03-21 PROCEDURE — 64615 CHEMODENERV MUSC MIGRAINE: CPT | Performed by: NURSE PRACTITIONER

## 2024-03-21 NOTE — PROGRESS NOTES
OFFICE PROCEDURE PROGRESS NOTE        Chart reviewed for the following:   Azam LEACH APRN - NP, have reviewed the History, Physical and updated the Allergic reactions for Kya Fred     TIME OUT performed immediately prior to start of procedure:   Azam LEACH APRN - NP, have performed the following reviews on Kya Fred prior to the start of the procedure:            * Patient was identified by name and date of birth   * Agreement on procedure being performed was verified  * Risks and Benefits explained to the patient  * Procedure site verified and marked as necessary  * Patient was positioned for comfort  * Consent was signed and verified     Time: 0900      Date of procedure: 3/21/2024    Procedure performed by:  MAO Tang NP    Provider assisted by: None    Patient assisted by: None    How tolerated by patient: tolerated the procedure well with no complications    Post Procedural Pain Scale: 2 - Hurts Little Bit    Comments: None          Botox Injection Note       Indication: patient has chronic recurrent migraine, has 7-10 less migraine days per month with botox injections    Procedure:   Botox concentration: 200 units in 4 ml of preservative-free normal saline.   31 sites injections, distribution as follow      Units/site  Sites Sides Subtotal    Procerus 5 1 1 5    5 1 2 10   Frontalis 5 2 2 20   Temporalis 5 4 2 40   Occipitalis 5 3 2 30   Upper cervical paraspinalis 5 2 2 20   Trapezius 5 3 2 30         200 units Botox were reconstituted, 155 units injected as above and the remainder was unavoidably wasted.     Patient tolerated procedure well.     _____________________________   AZAM MACARIO

## 2024-05-28 ENCOUNTER — PATIENT MESSAGE (OUTPATIENT)
Age: 49
End: 2024-05-28

## 2024-06-13 ENCOUNTER — PROCEDURE VISIT (OUTPATIENT)
Age: 49
End: 2024-06-13
Payer: COMMERCIAL

## 2024-06-13 DIAGNOSIS — G43.711 CHRONIC MIGRAINE WITHOUT AURA, INTRACTABLE, WITH STATUS MIGRAINOSUS: Primary | ICD-10-CM

## 2024-06-13 PROCEDURE — 64615 CHEMODENERV MUSC MIGRAINE: CPT | Performed by: NURSE PRACTITIONER

## 2024-06-13 NOTE — PROGRESS NOTES
OFFICE PROCEDURE PROGRESS NOTE        Chart reviewed for the following:   Azam LEACH APRN - NP, have reviewed the History, Physical and updated the Allergic reactions for Kya Fred     TIME OUT performed immediately prior to start of procedure:   Azam LEACH APRN - NP, have performed the following reviews on Kya Fred prior to the start of the procedure:            * Patient was identified by name and date of birth   * Agreement on procedure being performed was verified  * Risks and Benefits explained to the patient  * Procedure site verified and marked as necessary  * Patient was positioned for comfort  * Consent was signed and verified     Time: 1500      Date of procedure: 6/13/2024    Procedure performed by:  MAO Tang NP    Provider assisted by: None    Patient assisted by: None    How tolerated by patient: tolerated the procedure well with no complications    Post Procedural Pain Scale: 2 - Hurts Little Bit    Comments: None          Botox Injection Note       Indication: patient has chronic recurrent migraine, has 7-10 less migraine days per month with botox injections    Procedure:   Botox concentration: 200 units in 4 ml of preservative-free normal saline.   31 sites injections, distribution as follow      Units/site  Sites Sides Subtotal    Procerus 5 1 1 5    5 1 2 10   Frontalis 5 2 2 20   Temporalis 5 4 2 40   Occipitalis 5 3 2 30   Upper cervical paraspinalis 5 2 2 20   Trapezius 5 3 2 30         200 units Botox were reconstituted, 155 units injected as above and the remainder was unavoidably wasted.     Patient tolerated procedure well.     _____________________________   AZAM MACARIO

## 2024-07-18 ENCOUNTER — TELEPHONE (OUTPATIENT)
Age: 49
End: 2024-07-18

## 2024-07-18 NOTE — TELEPHONE ENCOUNTER
Zavzpret nasal spray   Case [id number]  Auth valid from 11/15/23 to 11/14/24    Letter uploaded to Media.

## 2024-08-05 RX ORDER — KETOROLAC TROMETHAMINE 30 MG/ML
INJECTION, SOLUTION INTRAMUSCULAR; INTRAVENOUS
Qty: 1 EACH | Refills: 1 | Status: SHIPPED | OUTPATIENT
Start: 2024-08-05

## 2024-08-05 RX ORDER — SYRINGE W-NEEDLE,DISPOSAB,3 ML 25GX5/8"
SYRINGE, EMPTY DISPOSABLE MISCELLANEOUS
Qty: 50 EACH | Refills: 4 | Status: SHIPPED | OUTPATIENT
Start: 2024-08-05

## 2024-08-30 ENCOUNTER — OFFICE VISIT (OUTPATIENT)
Age: 49
End: 2024-08-30
Payer: COMMERCIAL

## 2024-08-30 DIAGNOSIS — G43.711 CHRONIC MIGRAINE WITHOUT AURA, INTRACTABLE, WITH STATUS MIGRAINOSUS: Primary | ICD-10-CM

## 2024-08-30 PROCEDURE — 64615 CHEMODENERV MUSC MIGRAINE: CPT | Performed by: NURSE PRACTITIONER

## 2024-08-30 NOTE — PROGRESS NOTES
OFFICE PROCEDURE PROGRESS NOTE        Chart reviewed for the following:   Azam LEACH APRN - NP, have reviewed the History, Physical and updated the Allergic reactions for Kya Fred     TIME OUT performed immediately prior to start of procedure:   Azam LEACH APRN - NP, have performed the following reviews on Kya Fred prior to the start of the procedure:            * Patient was identified by name and date of birth   * Agreement on procedure being performed was verified  * Risks and Benefits explained to the patient  * Procedure site verified and marked as necessary  * Patient was positioned for comfort  * Consent was signed and verified     Time: 1100      Date of procedure: 8/30/2024    Procedure performed by:  MAO Tang NP    Provider assisted by: None    Patient assisted by: None    How tolerated by patient: tolerated the procedure well with no complications    Post Procedural Pain Scale: 2 - Hurts Little Bit    Comments: None          Botox Injection Note       Indication: patient has chronic recurrent migraine, has 7-10 less migraine days per month with botox injections    Procedure:   Botox concentration: 200 units in 4 ml of preservative-free normal saline.   31 sites injections, distribution as follow      Units/site  Sites Sides Subtotal    Procerus 5 1 1 5    5 1 2 10   Frontalis 5 2 2 20   Temporalis 5 4 2 40   Occipitalis 5 3 2 30   Upper cervical paraspinalis 5 2 2 20   Trapezius 5 3 2 30         200 units Botox were reconstituted, 155 units injected as above and the remainder was unavoidably wasted.     Patient tolerated procedure well.     _____________________________   AZAM MACARIO

## 2024-09-12 ENCOUNTER — TELEPHONE (OUTPATIENT)
Age: 49
End: 2024-09-12

## 2024-09-12 DIAGNOSIS — G43.711 CHRONIC MIGRAINE WITHOUT AURA, INTRACTABLE, WITH STATUS MIGRAINOSUS: Primary | ICD-10-CM

## 2024-09-12 RX ORDER — PROMETHAZINE HYDROCHLORIDE 25 MG/1
TABLET ORAL
Qty: 30 TABLET | Refills: 4 | Status: SHIPPED | OUTPATIENT
Start: 2024-09-12

## 2024-09-12 RX ORDER — NARATRIPTAN 2.5 MG/1
TABLET ORAL
Qty: 9 TABLET | Refills: 3 | Status: SHIPPED | OUTPATIENT
Start: 2024-09-12

## 2024-09-12 RX ORDER — PREDNISONE 10 MG/1
TABLET ORAL
Qty: 42 TABLET | Refills: 0 | Status: SHIPPED | OUTPATIENT
Start: 2024-09-12

## 2024-10-24 DIAGNOSIS — G43.711 CHRONIC MIGRAINE WITHOUT AURA, INTRACTABLE, WITH STATUS MIGRAINOSUS: Primary | ICD-10-CM

## 2024-10-25 RX ORDER — BUTALBITAL, ACETAMINOPHEN AND CAFFEINE 50; 325; 40 MG/1; MG/1; MG/1
1-2 TABLET ORAL EVERY 6 HOURS PRN
Qty: 60 TABLET | Refills: 4 | OUTPATIENT
Start: 2024-10-25

## 2024-10-25 RX ORDER — BUTALBITAL, ACETAMINOPHEN AND CAFFEINE 50; 325; 40 MG/1; MG/1; MG/1
1-2 TABLET ORAL EVERY 6 HOURS PRN
Qty: 60 TABLET | Refills: 4 | Status: SHIPPED | OUTPATIENT
Start: 2024-10-25

## 2024-10-28 RX ORDER — BUTALBITAL, ACETAMINOPHEN AND CAFFEINE 50; 325; 40 MG/1; MG/1; MG/1
TABLET ORAL
Qty: 60 TABLET | Refills: 4 | Status: SHIPPED | OUTPATIENT
Start: 2024-10-28 | End: 2024-10-31 | Stop reason: SDUPTHER

## 2024-10-31 ENCOUNTER — TELEPHONE (OUTPATIENT)
Age: 49
End: 2024-10-31

## 2024-10-31 RX ORDER — BUTALBITAL, ACETAMINOPHEN AND CAFFEINE 50; 325; 40 MG/1; MG/1; MG/1
TABLET ORAL
Qty: 60 TABLET | Refills: 4 | Status: SHIPPED | OUTPATIENT
Start: 2024-10-31

## 2024-10-31 NOTE — TELEPHONE ENCOUNTER
Is requesting for med script for Fioricet to be sent again as it is missing GALI #/    Please advise.

## 2024-11-01 ENCOUNTER — TELEPHONE (OUTPATIENT)
Age: 49
End: 2024-11-01

## 2024-11-01 NOTE — TELEPHONE ENCOUNTER
Patient requesting a call about medication Elva. She stated the pharmacy told her today that the prescription is incorrect.    It needs to be sent as a control substance medication.    Please call the pharmacy per patient directly.  [phone]

## 2024-11-01 NOTE — TELEPHONE ENCOUNTER
Called in verbal RX per NITHIN VARGAS NP, justin with GALI to [facility].   Patient was informed by phone.

## 2024-11-21 ENCOUNTER — OFFICE VISIT (OUTPATIENT)
Age: 49
End: 2024-11-21

## 2024-11-21 DIAGNOSIS — G43.711 CHRONIC MIGRAINE WITHOUT AURA, INTRACTABLE, WITH STATUS MIGRAINOSUS: ICD-10-CM

## 2024-11-21 DIAGNOSIS — G43.109 MIGRAINE WITH AURA, NOT INTRACTABLE, WITHOUT STATUS MIGRAINOSUS: ICD-10-CM

## 2024-11-21 RX ORDER — ALPRAZOLAM 0.25 MG/1
0.25 TABLET ORAL 3 TIMES DAILY PRN
Qty: 30 TABLET | Refills: 0 | Status: SHIPPED | OUTPATIENT
Start: 2024-11-21 | End: 2024-12-21

## 2024-11-22 NOTE — PROGRESS NOTES
OFFICE PROCEDURE PROGRESS NOTE        Chart reviewed for the following:   Azam LEACH APRN - NP, have reviewed the History, Physical and updated the Allergic reactions for Kya Fred     TIME OUT performed immediately prior to start of procedure:   Azam LEACH APRN - NP, have performed the following reviews on Kya Fred prior to the start of the procedure:            * Patient was identified by name and date of birth   * Agreement on procedure being performed was verified  * Risks and Benefits explained to the patient  * Procedure site verified and marked as necessary  * Patient was positioned for comfort  * Consent was signed and verified     Time: 1400      Date of procedure: 11/21/2024    Procedure performed by:  MAO Tang NP    Provider assisted by: None    Patient assisted by: None    How tolerated by patient: tolerated the procedure well with no complications    Post Procedural Pain Scale: 2 - Hurts Little Bit    Comments: None      Botox Injection Note       Indication: patient has chronic recurrent migraine, has 7-10 less migraine days per month with botox injections    Procedure:   Botox concentration: 200 units in 4 ml of preservative-free normal saline.   31 sites injections, distribution as follow      Units/site  Sites Sides Subtotal    Procerus 5 1 1 5    5 1 2 10   Frontalis 5 2 2 20   Temporalis 5 4 2 40   Occipitalis 5 3 2 30   Upper cervical paraspinalis 5 2 2 20   Trapezius 5 3 2 30         200 units Botox were reconstituted, 155 units injected as above and the remainder was unavoidably wasted.     Patient tolerated procedure well.     _____________________________   AZAM MACARIO

## 2024-12-09 ENCOUNTER — OFFICE VISIT (OUTPATIENT)
Age: 49
End: 2024-12-09
Payer: COMMERCIAL

## 2024-12-09 VITALS
SYSTOLIC BLOOD PRESSURE: 115 MMHG | RESPIRATION RATE: 18 BRPM | HEIGHT: 68 IN | TEMPERATURE: 97.1 F | DIASTOLIC BLOOD PRESSURE: 59 MMHG | OXYGEN SATURATION: 97 % | HEART RATE: 65 BPM | WEIGHT: 197 LBS | BODY MASS INDEX: 29.86 KG/M2

## 2024-12-09 DIAGNOSIS — G43.711 CHRONIC MIGRAINE WITHOUT AURA, INTRACTABLE, WITH STATUS MIGRAINOSUS: Primary | ICD-10-CM

## 2024-12-09 PROCEDURE — 99214 OFFICE O/P EST MOD 30 MIN: CPT | Performed by: NURSE PRACTITIONER

## 2024-12-09 NOTE — PROGRESS NOTES
Patient reports that since being on the botox she has half the amount of migraines as she did before and the intensity isn't nearly as painful

## 2024-12-11 RX ORDER — ATOGEPANT 60 MG/1
TABLET ORAL
Qty: 30 TABLET | Refills: 5 | Status: SHIPPED | OUTPATIENT
Start: 2024-12-11

## 2024-12-11 NOTE — PROGRESS NOTES
Kya Ibarra is a 49 y.o. female who presents with the following  Chief Complaint   Patient presents with    Follow-up     Patient is here following up for migraines and botox PA.        HPI      Follow-up for migraines  She is currently on Botox and having about 8-9 migraines a month  She was at chronic, 20 or more a month.   Lasting 6 hours or longer before starting botox.      She tried nortriptyline last visit and this caused her to feel bad and have increased anxiety so she stopped    She is unable to take triptans due to her PFO, cardiac history and TIA   She is using Fioricet vs. Prednisone     She has failed Aimovig, Ajovy, Topamax, Elavil, Inderal, Vyepti   Has also failed Ubrelvy, Nurtec    Never used Qulipta.         No Known Allergies    Current Outpatient Medications   Medication Sig Dispense Refill    ALPRAZolam (XANAX) 0.25 MG tablet Take 1 tablet by mouth 3 times daily as needed for Anxiety for up to 30 days. Max Daily Amount: 0.75 mg 30 tablet 0    butalbital-acetaminophen-caffeine (FIORICET, ESGIC) -40 MG per tablet take 1 TO 2 tablets by mouth every 6 hours if needed for headache 60 tablet 4    butalbital-acetaminophen-caffeine (FIORICET, ESGIC) -40 MG per tablet 1-2 tablets by mouth every 8 hours PRN up to 3 days a week for 30 days.  [facility] [id number] 60 tablet 4    butalbital-acetaminophen-caffeine (FIORICET, ESGIC) -40 MG per tablet Take 1-2 tablets by mouth every 6 hours as needed for Headaches 60 tablet 4    predniSONE (DELTASONE) 10 MG tablet 6 po x2 days, 5 po x 2days, 4 po x 2days, 3 po x2days, 2 po x2days, 1 po x 2days 42 tablet 0    promethazine (PHENERGAN) 25 MG tablet 1 tablet by mouth every 6 hours PRN for nausea or migraine 30 tablet 4    naratriptan (AMERGE) 2.5 MG tablet 1 at HA onset and repeat in 2 hours if needed.  Max 2 in 24 hours 9 tablet 3    Syringe/Needle, Disp, (SYRINGE 3CC/22GX3/4\") 22G X 3/4\" 3 ML MISC Use to draw up and injection IM toradol PRN 50

## 2024-12-13 ENCOUNTER — TELEPHONE (OUTPATIENT)
Age: 49
End: 2024-12-13

## 2024-12-13 NOTE — TELEPHONE ENCOUNTER
RE:Qulipta  Key: [id number]       Case submitted via EPIC has been approved 12/12/2024-03/12/2025. Letter in media    Nurse notified

## 2024-12-31 ENCOUNTER — TELEPHONE (OUTPATIENT)
Age: 49
End: 2024-12-31

## 2024-12-31 NOTE — TELEPHONE ENCOUNTER
Molly ( [facility] Member ) requesting for the office to call the member about a billing comparison from date of services ( 4/4/2023 and 11/21/24) for Botox    They have contacted the billing dept and was told to contact our office for comparison making sure the billing is correct.

## 2025-02-09 ENCOUNTER — TELEPHONE (OUTPATIENT)
Age: 50
End: 2025-02-09

## 2025-02-09 NOTE — TELEPHONE ENCOUNTER
Botox Drug Acquisition: BUY AND BILL  Drug: BOTOX 200 units  Dx: G43.711  Insurance: [place]  Submission Type: Availity  [facility]:   CPT: 68393  Reference #: [id number]   Availity Request Tracking ID: [id number]   Approval Range: 02/09/25 to 02/08/26

## 2025-02-12 ENCOUNTER — TELEPHONE (OUTPATIENT)
Age: 50
End: 2025-02-12

## 2025-02-28 ENCOUNTER — OFFICE VISIT (OUTPATIENT)
Age: 50
End: 2025-02-28

## 2025-02-28 DIAGNOSIS — G43.711 CHRONIC MIGRAINE WITHOUT AURA, INTRACTABLE, WITH STATUS MIGRAINOSUS: Primary | ICD-10-CM

## 2025-02-28 DIAGNOSIS — H53.9 VISUAL CHANGES: ICD-10-CM

## 2025-02-28 DIAGNOSIS — R42 DIZZY: ICD-10-CM

## 2025-02-28 NOTE — PROGRESS NOTES
OFFICE PROCEDURE PROGRESS NOTE        Chart reviewed for the following:   Azam LEACH APRN - NP, have reviewed the History, Physical and updated the Allergic reactions for Kya Fred     TIME OUT performed immediately prior to start of procedure:   Azam LEACH APRN - NP, have performed the following reviews on Kya Fred prior to the start of the procedure:              * Patient was identified by name and date of birth   * Agreement on procedure being performed was verified  * Risks and Benefits explained to the patient  * Procedure site verified and marked as necessary  * Patient was positioned for comfort  * Consent was signed and verified     Time: 1000      Date of procedure: 2/28/2025    Procedure performed by:  MAO Tang NP    Provider assisted by: None    Patient assisted by: None    How tolerated by patient: tolerated the procedure well with no complications    Post Procedural Pain Scale: 2 - Hurts Little Bit    Comments: None    MRI to rule out stroke, tumor, lesion,mass.   With visual changes and new onset aura, flashing lights   And her history.         Botox Injection Note       Indication: patient has chronic recurrent migraine, has 7-10 less migraine days per month with botox injections    Procedure:   Botox concentration: 200 units in 4 ml of preservative-free normal saline.   31 sites injections, distribution as follow      Units/site  Sites Sides Subtotal    Procerus 5 1 1 5    5 1 2 10   Frontalis 5 2 2 20   Temporalis 5 4 2 40   Occipitalis 5 3 2 30   Upper cervical paraspinalis 5 2 2 20   Trapezius 5 3 2 30         200 units Botox were reconstituted, 155 units injected as above and the remainder was unavoidably wasted.     Patient tolerated procedure well.     _____________________________   AZAM MACARIO

## 2025-03-27 ENCOUNTER — HOSPITAL ENCOUNTER (OUTPATIENT)
Facility: HOSPITAL | Age: 50
Discharge: HOME | End: 2025-03-30
Payer: COMMERCIAL

## 2025-03-27 VITALS
RESPIRATION RATE: 15 BRPM | HEART RATE: 79 BPM | TEMPERATURE: 97.5 F | OXYGEN SATURATION: 99 % | SYSTOLIC BLOOD PRESSURE: 101 MMHG | WEIGHT: 197 LBS | DIASTOLIC BLOOD PRESSURE: 60 MMHG | BODY MASS INDEX: 29.95 KG/M2

## 2025-03-27 DIAGNOSIS — G43.711 CHRONIC MIGRAINE WITHOUT AURA, INTRACTABLE, WITH STATUS MIGRAINOSUS: ICD-10-CM

## 2025-03-27 DIAGNOSIS — H53.9 VISUAL CHANGES: ICD-10-CM

## 2025-03-27 DIAGNOSIS — R42 DIZZY: ICD-10-CM

## 2025-03-27 PROCEDURE — 99152 MOD SED SAME PHYS/QHP 5/>YRS: CPT

## 2025-03-27 PROCEDURE — 6360000004 HC RX CONTRAST MEDICATION: Performed by: RADIOLOGY

## 2025-03-27 PROCEDURE — 99153 MOD SED SAME PHYS/QHP EA: CPT

## 2025-03-27 PROCEDURE — 70553 MRI BRAIN STEM W/O & W/DYE: CPT

## 2025-03-27 PROCEDURE — 6360000002 HC RX W HCPCS: Performed by: PHYSICIAN ASSISTANT

## 2025-03-27 PROCEDURE — A9575 INJ GADOTERATE MEGLUMI 0.1ML: HCPCS | Performed by: RADIOLOGY

## 2025-03-27 RX ORDER — FENTANYL CITRATE 50 UG/ML
100 INJECTION, SOLUTION INTRAMUSCULAR; INTRAVENOUS PRN
Refills: 0 | Status: DISCONTINUED | OUTPATIENT
Start: 2025-03-27 | End: 2025-03-31 | Stop reason: HOSPADM

## 2025-03-27 RX ORDER — MIDAZOLAM HYDROCHLORIDE 1 MG/ML
5 INJECTION, SOLUTION INTRAMUSCULAR; INTRAVENOUS PRN
Status: DISCONTINUED | OUTPATIENT
Start: 2025-03-27 | End: 2025-03-31 | Stop reason: HOSPADM

## 2025-03-27 RX ORDER — GADOTERATE MEGLUMINE 376.9 MG/ML
17 INJECTION INTRAVENOUS
Status: COMPLETED | OUTPATIENT
Start: 2025-03-27 | End: 2025-03-27

## 2025-03-27 RX ORDER — ATORVASTATIN CALCIUM 20 MG/1
20 TABLET, FILM COATED ORAL DAILY
COMMUNITY

## 2025-03-27 RX ADMIN — MIDAZOLAM HYDROCHLORIDE 1 MG: 1 INJECTION, SOLUTION INTRAMUSCULAR; INTRAVENOUS at 08:31

## 2025-03-27 RX ADMIN — MIDAZOLAM HYDROCHLORIDE 1 MG: 1 INJECTION, SOLUTION INTRAMUSCULAR; INTRAVENOUS at 08:34

## 2025-03-27 RX ADMIN — GADOTERATE MEGLUMINE 17 ML: 376.9 INJECTION INTRAVENOUS at 08:58

## 2025-03-27 RX ADMIN — MIDAZOLAM HYDROCHLORIDE 1 MG: 1 INJECTION, SOLUTION INTRAMUSCULAR; INTRAVENOUS at 08:37

## 2025-03-27 RX ADMIN — FENTANYL CITRATE 25 MCG: 0.05 INJECTION, SOLUTION INTRAMUSCULAR; INTRAVENOUS at 08:37

## 2025-03-27 RX ADMIN — FENTANYL CITRATE 25 MCG: 0.05 INJECTION, SOLUTION INTRAMUSCULAR; INTRAVENOUS at 08:34

## 2025-03-27 RX ADMIN — FENTANYL CITRATE 25 MCG: 0.05 INJECTION, SOLUTION INTRAMUSCULAR; INTRAVENOUS at 08:31

## 2025-03-27 NOTE — DISCHARGE INSTRUCTIONS
Sedation: Care Instructions  How can you care for yourself at home?    Sedation is the use of medicine to help you feel relaxed and comfortable during a procedure. The medicine is usually given in a vein (by I.V.). It may be used with numbing medicines.  There are different levels of sedation. They range from being awake but relaxed to being completely unconscious. Which level you have will depend on the procedure and your needs. You will be watched closely by a doctor or nurse during sedation.  Common side effects from sedation include:  Feeling sleepy or tired. (Your doctors and nurses will make sure you aren't too sleepy to go home.)  Feeling dizzy or unsteady.  Follow-up care is a key part of your treatment and safety. Be sure to make and go to all appointments, and call your doctor if you are having problems. It's also a good idea to know your test results and keep a list of the medicines you take.  Activity  Don't do anything that requires attention to detail until you recover. This includes going to work or school, making important decisions, and signing any legal documents. It takes time for the medicine effects to completely wear off.  For at least 24 hours, do not drive or operate any machinery.  When you get home, make sure to rest until the anesthesia has worn off. Some people will feel drowsy or dizzy for up to a few hours after leaving the hospital.  Take your time and walk slowly. Sudden changes in position may cause nausea.  Rest when you feel tired. Getting enough sleep will help you recover.  If you have sleep apnea and you have a CPAP machine, be sure to use it.  Diet  Don't drink alcohol for 24 hours.  You can eat your normal diet, unless your doctor gives you other instructions. If your stomach is upset, try clear liquids and bland, low-fat foods like plain toast or rice.  Drink plenty of fluids (unless your doctor tells you not to).  When should you call for help?  Call 911 anytime you

## 2025-03-27 NOTE — PROGRESS NOTES
0715  Obtained patient from [facility] for MRI of brain with sedation. Spouse Moody present. Patient ambulated to radiology holding. Verified name and . Patient is alert and oriented. Patient changed into hospital gown and placed belongings into bag. Confirmed patient has no known allergies. Home medications reviewed. Patient has been NPO since midnight. No complaints of pain. Attached patient to monitor. VSS on room air. Sinus rhythm. S1S2. Lungs CTA. RFA 22G PIV placed with blood return noted.  0745  Provider MD Linda arrived to patient's bedside for consent for MRI of brain with moderate sedation. Discussed risks and benefits of procedure as well as sedation plan. Consent obtained.  0820  MRI ready for patient. MRI staff reviewed screening form with patient. Screening completed. Patient ambulated to MRI and positioned onto MRI table supine. Attached to monitor/O2.  Time Out 0825. Start time 0826. Stop time 0855.  Patient received total of Versed 3 mg and Fentanyl 75 mcg.  Patient tolerated procedure well. Vitals remained stable throughout.  0900  Took patient via wheelchair back to radiology holding. Patient positioned onto stretcher. Patient is awake/alert and oriented. Attached patient to monitor. VSS on room air. Sinus rhythm. Offered patient drink and snack. Provided coffee, water and saltine crackers per request. Called and updated patient's spouse Moody.  0940  Vitals have remained stable on room air. Discussed discharge instructions with patient who verbalized understanding. Paper copy provided. RFA PIV removed with no complications. Monitoring devices removed. Patient changed back into personal clothing. Took patient outside via wheelchair where patient's spouse was waiting with vehicle. Patient positioned into passenger's side.

## 2025-03-27 NOTE — H&P
Interventional Radiology History and Physical      Patient: Kya Ibarra 49 y.o. female  [id number]    Consult Requested by:  Azam Cornelius, APR*    Chief Complaint: Migraines    History of Present Illness: 48 yo female presents for the above cc for MRI with sedation.    History:  Past Medical History:   Diagnosis Date    Anxiety     Fatigue     Headache     Nausea     Ringing in ears     Thyroid disease     Vertigo     Visual disturbance      Family History   Problem Relation Age of Onset    Heart Disease Father     Neuropathy Father     Diabetes Father     Stroke Father     Headache Father     Cancer Paternal Grandfather     Dementia Maternal Grandmother      Social History     Socioeconomic History    Marital status:      Spouse name: Not on file    Number of children: Not on file    Years of education: Not on file    Highest education level: Not on file   Occupational History    Not on file   Tobacco Use    Smoking status: Never    Smokeless tobacco: Never   Substance and Sexual Activity    Alcohol use: No    Drug use: Not on file    Sexual activity: Not on file   Other Topics Concern    Not on file   Social History Narrative    Not on file     Social Drivers of Health     Financial Resource Strain: Not on file   Food Insecurity: Not on file   Transportation Needs: Not on file   Physical Activity: Not on file   Stress: Not on file   Social Connections: Not on file   Intimate Partner Violence: Not on file   Housing Stability: Not on file       Allergies: No Known Allergies    Current Medications:  Current Outpatient Medications   Medication Sig    atorvastatin (LIPITOR) 20 MG tablet Take 1 tablet by mouth daily    butalbital-acetaminophen-caffeine (FIORICET, ESGIC) -40 MG per tablet 1-2 tablets by mouth every 8 hours PRN up to 3 days a week for 30 days.  GALI [id number]    Syringe/Needle, Disp, (SYRINGE 3CC/22GX3/4\") 22G X 3/4\" 3 ML MISC Use to draw up and injection IM toradol PRN    ketorolac

## 2025-05-08 ENCOUNTER — OFFICE VISIT (OUTPATIENT)
Age: 50
End: 2025-05-08

## 2025-05-08 DIAGNOSIS — G43.711 CHRONIC MIGRAINE WITHOUT AURA, INTRACTABLE, WITH STATUS MIGRAINOSUS: Primary | ICD-10-CM

## 2025-05-08 RX ORDER — TOPIRAMATE 50 MG/1
TABLET, FILM COATED ORAL
Qty: 60 TABLET | Refills: 5 | Status: SHIPPED | OUTPATIENT
Start: 2025-05-08

## 2025-05-09 NOTE — PROGRESS NOTES
OFFICE PROCEDURE PROGRESS NOTE      Chief Complaint   Patient presents with    Botox Injection     Patient states they are having    8 migraines per month, and patient has  50  % in reduction of migraines since the start of botox.               Chart reviewed for the following:   Nithin LEACH APRN - NP, have reviewed the History, Physical and updated the Allergic reactions for Kya Ibarra     TIME OUT performed immediately prior to start of procedure:   Nithin LEACH APRN - NP, have performed the following reviews on Kya Ibarra prior to the start of the procedure:            * Patient was identified by name and date of birth   * Agreement on procedure being performed was verified  * Risks and Benefits explained to the patient  * Procedure site verified and marked as necessary  * Patient was positioned for comfort  * Consent was signed and verified     Time: 1400      Date of procedure: 5/8/2025    Procedure performed by:  MAO Tang NP    Provider assisted by: None    Patient assisted by: None    How tolerated by patient: tolerated the procedure well with no complications    Post Procedural Pain Scale: 2 - Hurts Little Bit    Comments: None      Botox Injection Note       Indication: patient has chronic recurrent migraine, has 7-10 less migraine days per month with botox injections    Procedure:   Botox concentration: 200 units in 4 ml of preservative-free normal saline.   31 sites injections, distribution as follow      Units/site  Sites Sides Subtotal    Procerus 5 1 1 5    5 1 2 10   Frontalis 5 2 2 20   Temporalis 5 4 2 40   Occipitalis 5 3 2 30   Upper cervical paraspinalis 5 2 2 20   Trapezius 5 3 2 30         200 units Botox were reconstituted, 155 units injected as above and the remainder was unavoidably wasted.     Patient tolerated procedure well.     _____________________________   NITHIN MACARIO

## 2025-05-10 DIAGNOSIS — G43.711 CHRONIC MIGRAINE WITHOUT AURA, INTRACTABLE, WITH STATUS MIGRAINOSUS: Primary | ICD-10-CM

## 2025-05-13 DIAGNOSIS — G43.711 CHRONIC MIGRAINE WITHOUT AURA, INTRACTABLE, WITH STATUS MIGRAINOSUS: ICD-10-CM

## 2025-05-13 RX ORDER — BUTALBITAL, ACETAMINOPHEN AND CAFFEINE 50; 325; 40 MG/1; MG/1; MG/1
TABLET ORAL
Qty: 60 TABLET | Refills: 5 | Status: SHIPPED | OUTPATIENT
Start: 2025-05-13 | End: 2025-05-13 | Stop reason: SDUPTHER

## 2025-05-14 DIAGNOSIS — G43.711 CHRONIC MIGRAINE WITHOUT AURA, INTRACTABLE, WITH STATUS MIGRAINOSUS: ICD-10-CM

## 2025-05-14 RX ORDER — BUTALBITAL, ACETAMINOPHEN AND CAFFEINE 50; 325; 40 MG/1; MG/1; MG/1
TABLET ORAL
Qty: 60 TABLET | Refills: 5 | Status: SHIPPED | OUTPATIENT
Start: 2025-05-14 | End: 2025-05-15 | Stop reason: SDUPTHER

## 2025-05-15 DIAGNOSIS — G43.711 CHRONIC MIGRAINE WITHOUT AURA, INTRACTABLE, WITH STATUS MIGRAINOSUS: ICD-10-CM

## 2025-05-15 RX ORDER — BUTALBITAL, ACETAMINOPHEN AND CAFFEINE 50; 325; 40 MG/1; MG/1; MG/1
TABLET ORAL
Qty: 60 TABLET | Refills: 5 | Status: SHIPPED | OUTPATIENT
Start: 2025-05-15 | End: 2025-05-15 | Stop reason: SDUPTHER

## 2025-05-15 RX ORDER — BUTALBITAL, ACETAMINOPHEN AND CAFFEINE 50; 325; 40 MG/1; MG/1; MG/1
TABLET ORAL
Qty: 60 TABLET | Refills: 5 | Status: SHIPPED | OUTPATIENT
Start: 2025-05-15

## 2025-05-15 RX ORDER — BUTALBITAL, ACETAMINOPHEN AND CAFFEINE 50; 325; 40 MG/1; MG/1; MG/1
TABLET ORAL
Qty: 60 TABLET | Refills: 5 | OUTPATIENT
Start: 2025-05-15

## 2025-07-31 ENCOUNTER — OFFICE VISIT (OUTPATIENT)
Age: 50
End: 2025-07-31
Payer: COMMERCIAL

## 2025-07-31 DIAGNOSIS — G43.711 CHRONIC MIGRAINE WITHOUT AURA, INTRACTABLE, WITH STATUS MIGRAINOSUS: Primary | ICD-10-CM

## 2025-07-31 PROCEDURE — 64615 CHEMODENERV MUSC MIGRAINE: CPT | Performed by: NURSE PRACTITIONER

## 2025-07-31 NOTE — PROGRESS NOTES
Chart reviewed for the following:   Nithin LEACH DNP, have reviewed the History, Physical and updated the Allergic reactions for Kya Fred     TIME OUT performed immediately prior to start of procedure:   Nithin LEACH DNP, have performed the following reviews on Kya Fred prior to the start of the procedure:            * Patient was identified by name and date of birth   * Agreement on procedure being performed was verified  * Risks and Benefits explained to the patient  * Procedure site verified and marked as necessary  * Patient was positioned for comfort  * Consent was signed and verified     Time: 1500      Date of procedure: 7/31/2025    Procedure performed by:  Nithin Cornelius DNP    Provider assisted by: None    Patient assisted by: None    How tolerated by patient: tolerated the procedure well with no complications    Post Procedural Pain Scale: 2 - Hurts Little Bit    Comments: None        Botox Injection Note       Indication: patient has chronic recurrent migraine, has 7-10 less migraine days per month with botox injections    Procedure:   Botox concentration: 200 units in 4 ml of preservative-free normal saline.   31 sites injections, distribution as follow      Units/site  Sites Sides Subtotal    Procerus 5 1 1 5    5 1 2 10   Frontalis 5 2 2 20   Temporalis 5 4 2 40   Occipitalis 5 3 2 30   Upper cervical paraspinalis 5 2 2 20   Trapezius 5 3 2 30         200 units Botox were reconstituted, 155 units injected as above and the remainder was unavoidably wasted.     Patient tolerated procedure well.     _____________________________   NITHIN MACARIO